# Patient Record
Sex: FEMALE | Race: WHITE | NOT HISPANIC OR LATINO | Employment: FULL TIME | URBAN - METROPOLITAN AREA
[De-identification: names, ages, dates, MRNs, and addresses within clinical notes are randomized per-mention and may not be internally consistent; named-entity substitution may affect disease eponyms.]

---

## 2024-01-01 ENCOUNTER — APPOINTMENT (OUTPATIENT)
Dept: CT IMAGING | Facility: HOSPITAL | Age: 54
End: 2024-01-01
Payer: COMMERCIAL

## 2024-01-01 ENCOUNTER — HOSPITAL ENCOUNTER (OUTPATIENT)
Facility: HOSPITAL | Age: 54
Setting detail: OBSERVATION
Discharge: HOME | End: 2024-01-04
Attending: EMERGENCY MEDICINE | Admitting: STUDENT IN AN ORGANIZED HEALTH CARE EDUCATION/TRAINING PROGRAM
Payer: COMMERCIAL

## 2024-01-01 DIAGNOSIS — G40.909 SEIZURE DISORDER: ICD-10-CM

## 2024-01-01 DIAGNOSIS — R42 VERTIGO: Primary | ICD-10-CM

## 2024-01-01 PROBLEM — F17.200 SMOKER: Status: ACTIVE | Noted: 2022-01-07

## 2024-01-01 PROBLEM — E66.9 DIABETES MELLITUS TYPE 2 IN OBESE: Status: ACTIVE | Noted: 2022-01-10

## 2024-01-01 PROBLEM — E11.69 DIABETES MELLITUS TYPE 2 IN OBESE: Status: ACTIVE | Noted: 2022-01-10

## 2024-01-01 LAB
ANION GAP SERPL CALCULATED.3IONS-SCNC: 8.3 MMOL/L (ref 5–15)
BASOPHILS # BLD AUTO: 0.02 10*3/MM3 (ref 0–0.2)
BASOPHILS NFR BLD AUTO: 0.3 % (ref 0–1.5)
BUN SERPL-MCNC: 19 MG/DL (ref 6–20)
BUN/CREAT SERPL: 21.6 (ref 7–25)
CALCIUM SPEC-SCNC: 9.7 MG/DL (ref 8.6–10.5)
CHLORIDE SERPL-SCNC: 100 MMOL/L (ref 98–107)
CO2 SERPL-SCNC: 24.7 MMOL/L (ref 22–29)
CREAT SERPL-MCNC: 0.88 MG/DL (ref 0.57–1)
DEPRECATED RDW RBC AUTO: 48.5 FL (ref 37–54)
EGFRCR SERPLBLD CKD-EPI 2021: 78.7 ML/MIN/1.73
EOSINOPHIL # BLD AUTO: 0.09 10*3/MM3 (ref 0–0.4)
EOSINOPHIL NFR BLD AUTO: 1.3 % (ref 0.3–6.2)
ERYTHROCYTE [DISTWIDTH] IN BLOOD BY AUTOMATED COUNT: 14 % (ref 12.3–15.4)
GEN 5 2HR TROPONIN T REFLEX: <6 NG/L
GLUCOSE BLDC GLUCOMTR-MCNC: 114 MG/DL (ref 70–130)
GLUCOSE SERPL-MCNC: 113 MG/DL (ref 65–99)
HCT VFR BLD AUTO: 37.3 % (ref 34–46.6)
HGB BLD-MCNC: 12.1 G/DL (ref 12–15.9)
IMM GRANULOCYTES # BLD AUTO: 0.01 10*3/MM3 (ref 0–0.05)
IMM GRANULOCYTES NFR BLD AUTO: 0.1 % (ref 0–0.5)
LYMPHOCYTES # BLD AUTO: 2.39 10*3/MM3 (ref 0.7–3.1)
LYMPHOCYTES NFR BLD AUTO: 35.1 % (ref 19.6–45.3)
MCH RBC QN AUTO: 30.5 PG (ref 26.6–33)
MCHC RBC AUTO-ENTMCNC: 32.4 G/DL (ref 31.5–35.7)
MCV RBC AUTO: 94 FL (ref 79–97)
MONOCYTES # BLD AUTO: 0.48 10*3/MM3 (ref 0.1–0.9)
MONOCYTES NFR BLD AUTO: 7 % (ref 5–12)
NEUTROPHILS NFR BLD AUTO: 3.82 10*3/MM3 (ref 1.7–7)
NEUTROPHILS NFR BLD AUTO: 56.2 % (ref 42.7–76)
PLATELET # BLD AUTO: 185 10*3/MM3 (ref 140–450)
PMV BLD AUTO: 10.8 FL (ref 6–12)
POTASSIUM SERPL-SCNC: 4.2 MMOL/L (ref 3.5–5.2)
RBC # BLD AUTO: 3.97 10*6/MM3 (ref 3.77–5.28)
SODIUM SERPL-SCNC: 133 MMOL/L (ref 136–145)
TROPONIN T DELTA: NORMAL
TROPONIN T SERPL HS-MCNC: <6 NG/L
WBC NRBC COR # BLD AUTO: 6.81 10*3/MM3 (ref 3.4–10.8)

## 2024-01-01 PROCEDURE — 70498 CT ANGIOGRAPHY NECK: CPT

## 2024-01-01 PROCEDURE — 36415 COLL VENOUS BLD VENIPUNCTURE: CPT

## 2024-01-01 PROCEDURE — G0378 HOSPITAL OBSERVATION PER HR: HCPCS

## 2024-01-01 PROCEDURE — 99284 EMERGENCY DEPT VISIT MOD MDM: CPT | Performed by: EMERGENCY MEDICINE

## 2024-01-01 PROCEDURE — 85025 COMPLETE CBC W/AUTO DIFF WBC: CPT | Performed by: EMERGENCY MEDICINE

## 2024-01-01 PROCEDURE — 84484 ASSAY OF TROPONIN QUANT: CPT | Performed by: EMERGENCY MEDICINE

## 2024-01-01 PROCEDURE — 99285 EMERGENCY DEPT VISIT HI MDM: CPT

## 2024-01-01 PROCEDURE — 93005 ELECTROCARDIOGRAM TRACING: CPT | Performed by: EMERGENCY MEDICINE

## 2024-01-01 PROCEDURE — 82948 REAGENT STRIP/BLOOD GLUCOSE: CPT

## 2024-01-01 PROCEDURE — 25510000001 IOPAMIDOL PER 1 ML: Performed by: EMERGENCY MEDICINE

## 2024-01-01 PROCEDURE — 80048 BASIC METABOLIC PNL TOTAL CA: CPT | Performed by: EMERGENCY MEDICINE

## 2024-01-01 PROCEDURE — 70450 CT HEAD/BRAIN W/O DYE: CPT

## 2024-01-01 PROCEDURE — 70496 CT ANGIOGRAPHY HEAD: CPT

## 2024-01-01 PROCEDURE — 93010 ELECTROCARDIOGRAM REPORT: CPT | Performed by: EMERGENCY MEDICINE

## 2024-01-01 RX ORDER — SODIUM CHLORIDE 0.9 % (FLUSH) 0.9 %
10 SYRINGE (ML) INJECTION EVERY 12 HOURS SCHEDULED
Status: DISCONTINUED | OUTPATIENT
Start: 2024-01-01 | End: 2024-01-04 | Stop reason: HOSPADM

## 2024-01-01 RX ORDER — DIVALPROEX SODIUM 500 MG/1
1000 TABLET, DELAYED RELEASE ORAL DAILY
Status: DISCONTINUED | OUTPATIENT
Start: 2024-01-02 | End: 2024-01-03

## 2024-01-01 RX ORDER — SODIUM CHLORIDE 0.9 % (FLUSH) 0.9 %
10 SYRINGE (ML) INJECTION AS NEEDED
Status: DISCONTINUED | OUTPATIENT
Start: 2024-01-01 | End: 2024-01-04 | Stop reason: HOSPADM

## 2024-01-01 RX ORDER — NITROGLYCERIN 0.4 MG/1
0.4 TABLET SUBLINGUAL
Status: DISCONTINUED | OUTPATIENT
Start: 2024-01-01 | End: 2024-01-04 | Stop reason: HOSPADM

## 2024-01-01 RX ORDER — MECLIZINE HYDROCHLORIDE 25 MG/1
25 TABLET ORAL 3 TIMES DAILY PRN
Status: DISCONTINUED | OUTPATIENT
Start: 2024-01-01 | End: 2024-01-04 | Stop reason: HOSPADM

## 2024-01-01 RX ORDER — ROSUVASTATIN CALCIUM 10 MG/1
40 TABLET, COATED ORAL DAILY
Status: DISCONTINUED | OUTPATIENT
Start: 2024-01-02 | End: 2024-01-04 | Stop reason: HOSPADM

## 2024-01-01 RX ORDER — SERTRALINE HYDROCHLORIDE 100 MG/1
100 TABLET, FILM COATED ORAL DAILY
Status: DISCONTINUED | OUTPATIENT
Start: 2024-01-02 | End: 2024-01-04 | Stop reason: HOSPADM

## 2024-01-01 RX ORDER — CHOLECALCIFEROL (VITAMIN D3) 125 MCG
5 CAPSULE ORAL NIGHTLY PRN
Status: DISCONTINUED | OUTPATIENT
Start: 2024-01-01 | End: 2024-01-04 | Stop reason: HOSPADM

## 2024-01-01 RX ORDER — BISACODYL 10 MG
10 SUPPOSITORY, RECTAL RECTAL DAILY PRN
Status: DISCONTINUED | OUTPATIENT
Start: 2024-01-01 | End: 2024-01-04 | Stop reason: HOSPADM

## 2024-01-01 RX ORDER — BISACODYL 5 MG/1
5 TABLET, DELAYED RELEASE ORAL DAILY PRN
Status: DISCONTINUED | OUTPATIENT
Start: 2024-01-01 | End: 2024-01-04 | Stop reason: HOSPADM

## 2024-01-01 RX ORDER — ACETAMINOPHEN 160 MG/5ML
650 SOLUTION ORAL EVERY 4 HOURS PRN
Status: DISCONTINUED | OUTPATIENT
Start: 2024-01-01 | End: 2024-01-04 | Stop reason: HOSPADM

## 2024-01-01 RX ORDER — PANTOPRAZOLE SODIUM 40 MG/1
40 TABLET, DELAYED RELEASE ORAL EVERY MORNING
COMMUNITY

## 2024-01-01 RX ORDER — PANTOPRAZOLE SODIUM 40 MG/1
40 TABLET, DELAYED RELEASE ORAL
Status: DISCONTINUED | OUTPATIENT
Start: 2024-01-02 | End: 2024-01-04 | Stop reason: HOSPADM

## 2024-01-01 RX ORDER — SULFAMETHOXAZOLE AND TRIMETHOPRIM 800; 160 MG/1; MG/1
1 TABLET ORAL 2 TIMES DAILY
Status: DISCONTINUED | OUTPATIENT
Start: 2024-01-02 | End: 2024-01-04 | Stop reason: HOSPADM

## 2024-01-01 RX ORDER — HYDROCHLOROTHIAZIDE 12.5 MG/1
12.5 TABLET ORAL
Status: DISCONTINUED | OUTPATIENT
Start: 2024-01-02 | End: 2024-01-04 | Stop reason: HOSPADM

## 2024-01-01 RX ORDER — SULFAMETHOXAZOLE AND TRIMETHOPRIM 800; 160 MG/1; MG/1
1 TABLET ORAL 2 TIMES DAILY
Status: DISCONTINUED | OUTPATIENT
Start: 2024-01-02 | End: 2024-01-01

## 2024-01-01 RX ORDER — LISINOPRIL 20 MG/1
20 TABLET ORAL
Status: DISCONTINUED | OUTPATIENT
Start: 2024-01-02 | End: 2024-01-04 | Stop reason: HOSPADM

## 2024-01-01 RX ORDER — SERTRALINE HYDROCHLORIDE 100 MG/1
100 TABLET, FILM COATED ORAL EVERY MORNING
COMMUNITY

## 2024-01-01 RX ORDER — SODIUM CHLORIDE 9 MG/ML
40 INJECTION, SOLUTION INTRAVENOUS AS NEEDED
Status: DISCONTINUED | OUTPATIENT
Start: 2024-01-01 | End: 2024-01-04 | Stop reason: HOSPADM

## 2024-01-01 RX ORDER — DIPHENHYDRAMINE HCL 25 MG
25 CAPSULE ORAL EVERY 4 HOURS PRN
Status: DISCONTINUED | OUTPATIENT
Start: 2024-01-01 | End: 2024-01-04 | Stop reason: HOSPADM

## 2024-01-01 RX ORDER — ROSUVASTATIN CALCIUM 20 MG/1
40 TABLET, COATED ORAL EVERY MORNING
COMMUNITY
End: 2024-01-04 | Stop reason: HOSPADM

## 2024-01-01 RX ORDER — POLYETHYLENE GLYCOL 3350 17 G/17G
17 POWDER, FOR SOLUTION ORAL DAILY PRN
Status: DISCONTINUED | OUTPATIENT
Start: 2024-01-01 | End: 2024-01-04 | Stop reason: HOSPADM

## 2024-01-01 RX ORDER — EZETIMIBE 10 MG/1
10 TABLET ORAL EVERY MORNING
COMMUNITY

## 2024-01-01 RX ORDER — ALBUTEROL SULFATE 2.5 MG/3ML
2.5 SOLUTION RESPIRATORY (INHALATION) EVERY 6 HOURS PRN
Status: DISCONTINUED | OUTPATIENT
Start: 2024-01-01 | End: 2024-01-04 | Stop reason: HOSPADM

## 2024-01-01 RX ORDER — ACETAMINOPHEN 650 MG/1
650 SUPPOSITORY RECTAL EVERY 4 HOURS PRN
Status: DISCONTINUED | OUTPATIENT
Start: 2024-01-01 | End: 2024-01-04 | Stop reason: HOSPADM

## 2024-01-01 RX ORDER — AMOXICILLIN 250 MG
2 CAPSULE ORAL 2 TIMES DAILY PRN
Status: DISCONTINUED | OUTPATIENT
Start: 2024-01-01 | End: 2024-01-04 | Stop reason: HOSPADM

## 2024-01-01 RX ORDER — SULFAMETHOXAZOLE AND TRIMETHOPRIM 800; 160 MG/1; MG/1
1 TABLET ORAL 2 TIMES DAILY
COMMUNITY

## 2024-01-01 RX ORDER — MECLIZINE HYDROCHLORIDE 25 MG/1
50 TABLET ORAL ONCE
Status: COMPLETED | OUTPATIENT
Start: 2024-01-01 | End: 2024-01-01

## 2024-01-01 RX ORDER — ACETAMINOPHEN 325 MG/1
650 TABLET ORAL EVERY 4 HOURS PRN
Status: DISCONTINUED | OUTPATIENT
Start: 2024-01-01 | End: 2024-01-04 | Stop reason: HOSPADM

## 2024-01-01 RX ORDER — ONDANSETRON 2 MG/ML
4 INJECTION INTRAMUSCULAR; INTRAVENOUS EVERY 6 HOURS PRN
Status: DISCONTINUED | OUTPATIENT
Start: 2024-01-01 | End: 2024-01-04 | Stop reason: HOSPADM

## 2024-01-01 RX ADMIN — MECLIZINE HYDROCHLORIDE 50 MG: 25 TABLET ORAL at 17:25

## 2024-01-01 RX ADMIN — IOPAMIDOL 100 ML: 755 INJECTION, SOLUTION INTRAVENOUS at 19:06

## 2024-01-01 RX ADMIN — Medication 10 ML: at 23:21

## 2024-01-01 NOTE — FSED PROVIDER NOTE
Subjective   History of Present Illness  53-year-old female otherwise healthy coming in for dizziness described as vertigo that started yesterday when she woke up around 10 AM patient states that the room is spinning even without her moving her head left or right no blurry vision no headaches fevers chills shortness of breath chest pain head trauma has not taken anything for the dizziness        Review of Systems   Constitutional:  Negative for activity change and appetite change.   HENT:  Negative for congestion, rhinorrhea and sinus pressure.    Respiratory:  Negative for cough and shortness of breath.    Cardiovascular:  Negative for chest pain and leg swelling.   Gastrointestinal:  Negative for abdominal pain.   Genitourinary:  Negative for difficulty urinating and dysuria.   Neurological:  Positive for dizziness. Negative for headaches.   All other systems reviewed and are negative.      Past Medical History:   Diagnosis Date    Arthritis     lower back    Asthma     Depression     Diabetes mellitus     Epilepsy     High cholesterol     History of pneumonia     Hypertension     Recurrent boils     venessa inner thighs pt applies clindamycin topical and is taking doxycycline     Sleep apnea     no machine    Urinary incontinence        Allergies   Allergen Reactions    Amoxicillin-Pot Clavulanate Other (See Comments)     Bad yeast infection       Past Surgical History:   Procedure Laterality Date    CYSTOSCOPY W/ BULKING AGENT INJECTION N/A 3/11/2016    Procedure: CYSTOSCOPY WITH COAPTITE INJECTION;  Surgeon: Luis Dumont MD;  Location: [facility];  Service:     LAPAROSCOPIC TUBAL LIGATION Bilateral     TRANSVAGINAL TAPING SUSPENSION         History reviewed. No pertinent family history.    Social History     Socioeconomic History    Marital status:    Tobacco Use    Smoking status: Every Day     Packs/day: 1.50     Years: 30.00     Additional pack years: 0.00     Total pack years: 45.00      Types: Cigarettes   Substance and Sexual Activity    Alcohol use: Yes     Comment: couple month    Drug use: No           Objective   Physical Exam  Vitals and nursing note reviewed.   Constitutional:       General: She is not in acute distress.     Appearance: She is well-developed. She is not ill-appearing.   HENT:      Head: Normocephalic and atraumatic.      Right Ear: Tympanic membrane normal.      Left Ear: Tympanic membrane normal.      Nose: No congestion or rhinorrhea.   Pulmonary:      Effort: Pulmonary effort is normal. No respiratory distress.      Breath sounds: Normal breath sounds. No stridor.   Neurological:      General: No focal deficit present.      Mental Status: She is alert and oriented to person, place, and time. Mental status is at baseline. She is disoriented.      Cranial Nerves: No cranial nerve deficit.      Sensory: No sensory deficit.      Motor: No weakness.      Coordination: Coordination normal.         Procedures           ED Course                                           Medical Decision Making  53-year-old female otherwise healthy coming in for dizziness which she describes as vertigo neuroexam benign NIH is 0 CT angio CT head pending labs within normal limits follow-up with CT and touch base with neurology hospitalist in terms of disposition    Amount and/or Complexity of Data Reviewed  Labs: ordered.  Radiology: ordered.  ECG/medicine tests: ordered.    Risk  Prescription drug management.        Final diagnoses:   None       ED Disposition  ED Disposition       None            No follow-up provider specified.       Medication List      No changes were made to your prescriptions during this visit.

## 2024-01-02 ENCOUNTER — APPOINTMENT (OUTPATIENT)
Dept: CARDIOLOGY | Facility: HOSPITAL | Age: 54
End: 2024-01-02
Payer: COMMERCIAL

## 2024-01-02 ENCOUNTER — APPOINTMENT (OUTPATIENT)
Dept: MRI IMAGING | Facility: HOSPITAL | Age: 54
End: 2024-01-02
Payer: COMMERCIAL

## 2024-01-02 LAB
AMMONIA BLD-SCNC: 58 UMOL/L (ref 11–51)
ANION GAP SERPL CALCULATED.3IONS-SCNC: 10 MMOL/L (ref 5–15)
BASOPHILS # BLD AUTO: 0.1 10*3/MM3 (ref 0–0.2)
BASOPHILS NFR BLD AUTO: 0.9 % (ref 0–1.5)
BH CV ECHO MEAS - ACS: 2 CM
BH CV ECHO MEAS - AO MAX PG: 9.1 MMHG
BH CV ECHO MEAS - AO MEAN PG: 5 MMHG
BH CV ECHO MEAS - AO V2 MAX: 151 CM/SEC
BH CV ECHO MEAS - AO V2 VTI: 28.3 CM
BH CV ECHO MEAS - AVA(I,D): 3.2 CM2
BH CV ECHO MEAS - EDV(CUBED): 54.9 ML
BH CV ECHO MEAS - EDV(MOD-SP4): 45.9 ML
BH CV ECHO MEAS - EF(MOD-SP4): 54.9 %
BH CV ECHO MEAS - ESV(CUBED): 17.6 ML
BH CV ECHO MEAS - ESV(MOD-SP4): 20.7 ML
BH CV ECHO MEAS - FS: 31.6 %
BH CV ECHO MEAS - IVS/LVPW: 1.09 CM
BH CV ECHO MEAS - IVSD: 1.2 CM
BH CV ECHO MEAS - LA DIMENSION: 3.2 CM
BH CV ECHO MEAS - LAT PEAK E' VEL: 11.2 CM/SEC
BH CV ECHO MEAS - LV MASS(C)D: 143.8 GRAMS
BH CV ECHO MEAS - LV MAX PG: 8.8 MMHG
BH CV ECHO MEAS - LV MEAN PG: 4 MMHG
BH CV ECHO MEAS - LV V1 MAX: 148 CM/SEC
BH CV ECHO MEAS - LV V1 VTI: 29 CM
BH CV ECHO MEAS - LVIDD: 3.8 CM
BH CV ECHO MEAS - LVIDS: 2.6 CM
BH CV ECHO MEAS - LVOT AREA: 3.1 CM2
BH CV ECHO MEAS - LVOT DIAM: 2 CM
BH CV ECHO MEAS - LVPWD: 1.1 CM
BH CV ECHO MEAS - MED PEAK E' VEL: 12 CM/SEC
BH CV ECHO MEAS - MV A MAX VEL: 73.4 CM/SEC
BH CV ECHO MEAS - MV DEC SLOPE: 253 CM/SEC2
BH CV ECHO MEAS - MV DEC TIME: 0.3 SEC
BH CV ECHO MEAS - MV E MAX VEL: 64.5 CM/SEC
BH CV ECHO MEAS - MV E/A: 0.88
BH CV ECHO MEAS - MV MAX PG: 2.8 MMHG
BH CV ECHO MEAS - MV MEAN PG: 1 MMHG
BH CV ECHO MEAS - MV P1/2T: 100.7 MSEC
BH CV ECHO MEAS - MV V2 VTI: 28.6 CM
BH CV ECHO MEAS - MVA(P1/2T): 2.18 CM2
BH CV ECHO MEAS - MVA(VTI): 3.2 CM2
BH CV ECHO MEAS - PA V2 MAX: 110.5 CM/SEC
BH CV ECHO MEAS - PULM A REVS DUR: 0.12 SEC
BH CV ECHO MEAS - PULM A REVS VEL: 33.6 CM/SEC
BH CV ECHO MEAS - PULM DIAS VEL: 36 CM/SEC
BH CV ECHO MEAS - PULM S/D: 1.79
BH CV ECHO MEAS - PULM SYS VEL: 64.4 CM/SEC
BH CV ECHO MEAS - QP/QS: 0.46
BH CV ECHO MEAS - RV MAX PG: 1.36 MMHG
BH CV ECHO MEAS - RV V1 MAX: 58.3 CM/SEC
BH CV ECHO MEAS - RV V1 VTI: 13.3 CM
BH CV ECHO MEAS - RVDD: 3.3 CM
BH CV ECHO MEAS - RVOT DIAM: 2 CM
BH CV ECHO MEAS - SV(LVOT): 91.1 ML
BH CV ECHO MEAS - SV(MOD-SP4): 25.2 ML
BH CV ECHO MEAS - SV(RVOT): 41.8 ML
BH CV ECHO MEAS - TAPSE (>1.6): 2.2 CM
BH CV ECHO MEASUREMENTS AVERAGE E/E' RATIO: 5.56
BH CV XLRA - TDI S': 12.6 CM/SEC
BUN SERPL-MCNC: 19 MG/DL (ref 6–20)
BUN/CREAT SERPL: 21.1 (ref 7–25)
CALCIUM SPEC-SCNC: 9.8 MG/DL (ref 8.6–10.5)
CHLORIDE SERPL-SCNC: 96 MMOL/L (ref 98–107)
CHOLEST SERPL-MCNC: 138 MG/DL (ref 0–200)
CO2 SERPL-SCNC: 26 MMOL/L (ref 22–29)
CREAT SERPL-MCNC: 0.9 MG/DL (ref 0.57–1)
CRP SERPL-MCNC: <0.3 MG/DL (ref 0–0.5)
DEPRECATED RDW RBC AUTO: 48.1 FL (ref 37–54)
EGFRCR SERPLBLD CKD-EPI 2021: 76.6 ML/MIN/1.73
EOSINOPHIL # BLD AUTO: 0.2 10*3/MM3 (ref 0–0.4)
EOSINOPHIL NFR BLD AUTO: 1.9 % (ref 0.3–6.2)
ERYTHROCYTE [DISTWIDTH] IN BLOOD BY AUTOMATED COUNT: 14.8 % (ref 12.3–15.4)
ERYTHROCYTE [SEDIMENTATION RATE] IN BLOOD: 9 MM/HR (ref 0–30)
GLUCOSE SERPL-MCNC: 110 MG/DL (ref 65–99)
HBA1C MFR BLD: 6 % (ref 4.8–5.6)
HCT VFR BLD AUTO: 43.3 % (ref 34–46.6)
HDLC SERPL-MCNC: 36 MG/DL (ref 40–60)
HGB BLD-MCNC: 14.3 G/DL (ref 12–15.9)
LDLC SERPL CALC-MCNC: 77 MG/DL (ref 0–100)
LDLC/HDLC SERPL: 2.05 {RATIO}
LYMPHOCYTES # BLD AUTO: 3.4 10*3/MM3 (ref 0.7–3.1)
LYMPHOCYTES NFR BLD AUTO: 41.4 % (ref 19.6–45.3)
MAGNESIUM SERPL-MCNC: 2.3 MG/DL (ref 1.6–2.6)
MCH RBC QN AUTO: 30.7 PG (ref 26.6–33)
MCHC RBC AUTO-ENTMCNC: 33 G/DL (ref 31.5–35.7)
MCV RBC AUTO: 93.1 FL (ref 79–97)
MONOCYTES # BLD AUTO: 0.6 10*3/MM3 (ref 0.1–0.9)
MONOCYTES NFR BLD AUTO: 6.8 % (ref 5–12)
NEUTROPHILS NFR BLD AUTO: 4 10*3/MM3 (ref 1.7–7)
NEUTROPHILS NFR BLD AUTO: 49 % (ref 42.7–76)
NRBC BLD AUTO-RTO: 0.1 /100 WBC (ref 0–0.2)
PHOSPHATE SERPL-MCNC: 4.3 MG/DL (ref 2.5–4.5)
PLATELET # BLD AUTO: 231 10*3/MM3 (ref 140–450)
PMV BLD AUTO: 9.4 FL (ref 6–12)
POTASSIUM SERPL-SCNC: 3.6 MMOL/L (ref 3.5–5.2)
QT INTERVAL: 373 MS
QTC INTERVAL: 451 MS
RBC # BLD AUTO: 4.65 10*6/MM3 (ref 3.77–5.28)
SINUS: 3.3 CM
SODIUM SERPL-SCNC: 132 MMOL/L (ref 136–145)
TRIGL SERPL-MCNC: 141 MG/DL (ref 0–150)
TSH SERPL DL<=0.05 MIU/L-ACNC: 3.06 UIU/ML (ref 0.27–4.2)
VALPROATE SERPL-MCNC: 67 MCG/ML (ref 50–125)
VLDLC SERPL-MCNC: 25 MG/DL (ref 5–40)
WBC NRBC COR # BLD AUTO: 8.1 10*3/MM3 (ref 3.4–10.8)

## 2024-01-02 PROCEDURE — G0378 HOSPITAL OBSERVATION PER HR: HCPCS

## 2024-01-02 PROCEDURE — 99214 OFFICE O/P EST MOD 30 MIN: CPT | Performed by: NURSE PRACTITIONER

## 2024-01-02 PROCEDURE — 86140 C-REACTIVE PROTEIN: CPT | Performed by: NURSE PRACTITIONER

## 2024-01-02 PROCEDURE — 80164 ASSAY DIPROPYLACETIC ACD TOT: CPT | Performed by: NURSE PRACTITIONER

## 2024-01-02 PROCEDURE — 82607 VITAMIN B-12: CPT | Performed by: NURSE PRACTITIONER

## 2024-01-02 PROCEDURE — 85025 COMPLETE CBC W/AUTO DIFF WBC: CPT

## 2024-01-02 PROCEDURE — 83735 ASSAY OF MAGNESIUM: CPT | Performed by: NURSE PRACTITIONER

## 2024-01-02 PROCEDURE — 93306 TTE W/DOPPLER COMPLETE: CPT | Performed by: INTERNAL MEDICINE

## 2024-01-02 PROCEDURE — 93306 TTE W/DOPPLER COMPLETE: CPT

## 2024-01-02 PROCEDURE — 83036 HEMOGLOBIN GLYCOSYLATED A1C: CPT | Performed by: NURSE PRACTITIONER

## 2024-01-02 PROCEDURE — 70551 MRI BRAIN STEM W/O DYE: CPT

## 2024-01-02 PROCEDURE — 80061 LIPID PANEL: CPT | Performed by: NURSE PRACTITIONER

## 2024-01-02 PROCEDURE — 84100 ASSAY OF PHOSPHORUS: CPT | Performed by: NURSE PRACTITIONER

## 2024-01-02 PROCEDURE — 85652 RBC SED RATE AUTOMATED: CPT | Performed by: NURSE PRACTITIONER

## 2024-01-02 PROCEDURE — 84443 ASSAY THYROID STIM HORMONE: CPT | Performed by: NURSE PRACTITIONER

## 2024-01-02 PROCEDURE — 82140 ASSAY OF AMMONIA: CPT | Performed by: NURSE PRACTITIONER

## 2024-01-02 PROCEDURE — 80048 BASIC METABOLIC PNL TOTAL CA: CPT

## 2024-01-02 RX ORDER — LORAZEPAM 1 MG/1
1 TABLET ORAL ONCE
Status: COMPLETED | OUTPATIENT
Start: 2024-01-02 | End: 2024-01-02

## 2024-01-02 RX ADMIN — ROSUVASTATIN 40 MG: 10 TABLET, FILM COATED ORAL at 08:06

## 2024-01-02 RX ADMIN — SULFAMETHOXAZOLE AND TRIMETHOPRIM 1 TABLET: 800; 160 TABLET ORAL at 21:01

## 2024-01-02 RX ADMIN — LORAZEPAM 1 MG: 1 TABLET ORAL at 14:31

## 2024-01-02 RX ADMIN — DIVALPROEX SODIUM 1000 MG: 500 TABLET, DELAYED RELEASE ORAL at 08:06

## 2024-01-02 RX ADMIN — SULFAMETHOXAZOLE AND TRIMETHOPRIM 1 TABLET: 800; 160 TABLET ORAL at 08:10

## 2024-01-02 RX ADMIN — HYDROCHLOROTHIAZIDE 12.5 MG: 12.5 TABLET ORAL at 08:06

## 2024-01-02 RX ADMIN — Medication 10 ML: at 21:01

## 2024-01-02 RX ADMIN — Medication 10 ML: at 08:06

## 2024-01-02 RX ADMIN — SERTRALINE 100 MG: 100 TABLET, FILM COATED ORAL at 08:06

## 2024-01-02 RX ADMIN — LISINOPRIL 20 MG: 20 TABLET ORAL at 08:06

## 2024-01-02 RX ADMIN — SULFAMETHOXAZOLE AND TRIMETHOPRIM 1 TABLET: 800; 160 TABLET ORAL at 00:07

## 2024-01-02 RX ADMIN — PANTOPRAZOLE SODIUM 40 MG: 40 TABLET, DELAYED RELEASE ORAL at 08:06

## 2024-01-02 NOTE — CONSULTS
Primary Care Provider: Vito Schreiber MD     Consult requested by:  Dr. Sheets    Reason for Consultation: Neurological evaluation     History taken from: patient chart RN    Chief complaint: vertigo        SUBJECTIVE:    History of present illness: Background per H&P: Vero Cadena is a 53 y.o. year old female who presented to [facility] on 1/1/2024 with dizziness that was present when she woke up on 12/31/2023.  She states that the room is spinning even without her moving her head.  She denies blurry vision, headaches, chest pain, weakness, trouble speaking.     At [facility], labwork is unremarkable. CT of the head showed no acute abnormality.  CTA of the head and neck showed no intracranial large vessel occlusion, mild bilateral proximal ICA stenosis is noted. EKG shows SR, HR 88.  She is afebrile, all vitals are stable.  Hospitalist was consulted for transfer to [facility] for an MRI of the brain and further management.    Previous medical history of Asthma, tobacco abuse, HTN, Hyperlipidemia, depression, seizures and DM   - Portions of the above HPI were copied from previous encounters and edited as appropriate. PMH as detailed below.     Pt continues to have vertigo when she sits up or changes position. Improved when lying flat. She denies any other complaints. No focal deficits, ataxia, vision changes (aside from difficulty focusing due to vertigo). No ear pain, drainage, or pressure. No tinnitus. No head injury, falls, coughing, sneezing, straining, or whiplash type injury. No hx of vertigo    Review of Systems   Constitutional:  Negative for chills, diaphoresis and fatigue.   Eyes:  Negative for photophobia and visual disturbance.   Neurological:  Positive for dizziness. Negative for tremors, seizures, syncope, facial asymmetry, speech difficulty, weakness, light-headedness, numbness and headaches.           PATIENT HISTORY:  Past Medical History:   Diagnosis Date    Arthritis      lower back    Asthma     Depression     Diabetes mellitus     Epilepsy     High cholesterol     History of pneumonia     Hypertension     Recurrent boils     venessa inner thighs pt applies clindamycin topical and is taking doxycycline     Sleep apnea     no machine    Urinary incontinence    ,   Past Surgical History:   Procedure Laterality Date    CYSTOSCOPY W/ BULKING AGENT INJECTION N/A 3/11/2016    Procedure: CYSTOSCOPY WITH COAPTITE INJECTION;  Surgeon: Luis Dumont MD;  Location: [facility] OR;  Service:     LAPAROSCOPIC TUBAL LIGATION Bilateral     TRANSVAGINAL TAPING SUSPENSION     , History reviewed. No pertinent family history.,   Social History     Tobacco Use    Smoking status: Every Day     Packs/day: 1.50     Years: 30.00     Additional pack years: 0.00     Total pack years: 45.00     Types: Cigarettes     Passive exposure: Current   Vaping Use    Vaping Use: Never used   Substance Use Topics    Alcohol use: Not Currently     Comment: couple month    Drug use: No   ,   Prior to Admission medications    Medication Sig Start Date End Date Taking? Authorizing Provider   clindamycin (CLEOCIN T) 1 % swab Apply 1 application topically 2 (two) times a day.   Yes Jina Fernandes MD   divalproex (DEPAKOTE) 500 MG EC tablet Take 2 tablets by mouth Every Morning.   Yes Jina Fernandes MD   ezetimibe (ZETIA) 10 MG tablet Take 1 tablet by mouth Every Morning.   Yes Jina Fernandes MD   lisinopril-hydrochlorothiazide (PRINZIDE,ZESTORETIC) 20-12.5 MG per tablet Take 1 tablet by mouth Every Morning.   Yes Jina Fernandes MD   meloxicam (MOBIC) 7.5 MG tablet Take 1 tablet by mouth Daily.   Yes Jina Fernandes MD   metFORMIN (GLUCOPHAGE) 500 MG tablet Take 1 tablet by mouth Daily With Breakfast.   Yes Jina Fernandes MD   pantoprazole (PROTONIX) 40 MG EC tablet Take 1 tablet by mouth Every Morning.   Yes Jina Fernandes MD   rosuvastatin (CRESTOR) 20 MG tablet Take 2  tablets by mouth Every Morning.   Yes Jina Fernandes MD   sertraline (ZOLOFT) 100 MG tablet Take 1 tablet by mouth Every Morning.   Yes Jina Fernandes MD   sulfamethoxazole-trimethoprim (BACTRIM DS,SEPTRA DS) 800-160 MG per tablet Take 1 tablet by mouth 2 (Two) Times a Day.   Yes Jina Fernandes MD   acetaminophen (TYLENOL) 500 MG tablet Take 1 tablet by mouth Every 4 (Four) Hours As Needed for Mild Pain.    Jina Fernandes MD   albuterol (PROVENTIL HFA;VENTOLIN HFA) 108 (90 BASE) MCG/ACT inhaler Inhale 2 puffs Every 4 (Four) Hours As Needed for Wheezing.    Jina Fernandes MD   ARIPiprazole (ABILIFY) 2 MG tablet Take 1 tablet by mouth Every Morning.    Jina Fernandes MD   atorvastatin (LIPITOR) 20 MG tablet Take 1 tablet by mouth Every Morning.    Jina Fernandes MD   azithromycin (Zithromax Z-Wyatt) 250 MG tablet Take 2 tablets by mouth on day 1, then 1 tablet daily on days 2-5 3/6/23   Tyler Montelongo MD   doxycycline (DORYX) 100 MG DR capsule Take 1 capsule by mouth 2 (Two) Times a Day.    Jina Fernandes MD   escitalopram (LEXAPRO) 10 MG tablet Take 1 tablet by mouth Daily.    Jina Fernandes MD   Fluticasone Furoate-Vilanterol 200-25 MCG/INH aerosol powder  Inhale 1 puff Every Night.    Jina Fernandes MD   HYDROcodone-acetaminophen (NORCO) 5-325 MG per tablet Take 1 tablet by mouth every 6 (six) hours as needed for severe pain (7-10). 7/12/16   Saw Rivera MD   methocarbamol (ROBAXIN) 500 MG tablet Take 1 tablet by mouth 4 (four) times a day as needed for muscle spasms. 7/12/16   Enmanuel Ramires III, PA   methylphenidate (CONCERTA) 27 MG CR tablet Take 1 tablet by mouth 4 am    Jina Fernandes MD   methylphenidate (RITALIN) 20 MG tablet Take 1 tablet by mouth. 2 pm    Jina Fernandes MD   Mirabegron ER 50 MG tablet sustained-release 24 hour Take 50 mg by mouth Every Morning.    Provider, Historical, MD   montelukast  (SINGULAIR) 10 MG tablet Take 1 tablet by mouth Every Night.    Provider, MD Jina    Allergies:  Amoxicillin-pot clavulanate    Current Facility-Administered Medications   Medication Dose Route Frequency Provider Last Rate Last Admin    acetaminophen (TYLENOL) tablet 650 mg  650 mg Oral Q4H PRN Grecia Castellano APRN        Or    acetaminophen (TYLENOL) 160 MG/5ML oral solution 650 mg  650 mg Oral Q4H PRN Grecia Castellano APRN        Or    acetaminophen (TYLENOL) suppository 650 mg  650 mg Rectal Q4H PRN Grecia Castellano APRN        albuterol (PROVENTIL) nebulizer solution 0.083% 2.5 mg/3mL  2.5 mg Nebulization Q6H PRN Grecia Castellano APRN        sennosides-docusate (PERICOLACE) 8.6-50 MG per tablet 2 tablet  2 tablet Oral BID PRN Grecia Castellano APRN        And    polyethylene glycol (MIRALAX) packet 17 g  17 g Oral Daily PRN Grecia Castellano APRN        And    bisacodyl (DULCOLAX) EC tablet 5 mg  5 mg Oral Daily PRN Grecia Castellano APRN        And    bisacodyl (DULCOLAX) suppository 10 mg  10 mg Rectal Daily PRN Grecia Castellano APRN        diphenhydrAMINE (BENADRYL) capsule 25 mg  25 mg Oral Q4H PRN Grecia Castellano APRN        divalproex (DEPAKOTE) DR tablet 1,000 mg  1,000 mg Oral Daily Grecia Castellano APRN   1,000 mg at 01/02/24 0806    lisinopril (PRINIVIL,ZESTRIL) tablet 20 mg  20 mg Oral Q24H Grecia Castellano APRN   20 mg at 01/02/24 0806    And    hydroCHLOROthiazide (HYDRODIURIL) tablet 12.5 mg  12.5 mg Oral Q24H Grecia Castellano APRN   12.5 mg at 01/02/24 0806    meclizine (ANTIVERT) tablet 25 mg  25 mg Oral TID PRN Grecia Castellano APRN        melatonin tablet 5 mg  5 mg Oral Nightly PRN Grecia Castellano APRN        nitroglycerin (NITROSTAT) SL tablet 0.4 mg  0.4 mg Sublingual Q5 Min PRN Grecia Castellano APRN        ondansetron (ZOFRAN) injection 4 mg  4 mg Intravenous Q6H PRN Grecia Castellano APRN        pantoprazole (PROTONIX) EC tablet 40 mg  40 mg Oral QAM AC Grecia Castellano  RUTHIE, APRN   40 mg at 01/02/24 0806    rosuvastatin (CRESTOR) tablet 40 mg  40 mg Oral Daily Grecia Castellano APRN   40 mg at 01/02/24 0806    sertraline (ZOLOFT) tablet 100 mg  100 mg Oral Daily Grecia Castellano APRN   100 mg at 01/02/24 0806    sodium chloride 0.9 % flush 10 mL  10 mL Intravenous Q12H Grecia Castellano, APRN   10 mL at 01/02/24 0806    sodium chloride 0.9 % flush 10 mL  10 mL Intravenous PRN Grecia Castellano APRGINGER        sodium chloride 0.9 % infusion 40 mL  40 mL Intravenous PRN Shawnee Castellanojen HENDRICKS APRGINGER        sulfamethoxazole-trimethoprim (BACTRIM DS,SEPTRA DS) 800-160 MG per tablet 1 tablet  1 tablet Oral BID Grecia Castellano APRN   1 tablet at 01/02/24 0810        ________________________________________________________        OBJECTIVE:  Upon today's exam, pt is awake and alert. Oriented x3.      Neurologic Exam  PHYSICAL EXAM:    CONSTITUTIONAL: The patient is in no apparent distress, bright awake and alert.      HEENT: There is no tenderness over the temporal arteries bilaterally. Normocephalic, atraumatic. TMJ open symmetrically without tenderness.    NECK: ROM normal, supple    RESPIRATORY: Breathing unlabored, Breath sounds are normal    CARDIAC: Regular rate and rhythm.     EXTREMITIES:  No clubbing, cyanosis or edema.    PSYCHIATRIC: Mood/affect normal, judgement normal, appropriate    NEUROLOGICAL:    Cognition:   Fully oriented.  Fund of knowledge excellent.  Concentration and attention normal.   Language normal with normal comprehension, fluent speech, intact repetition and naming.   Short and long term memory appears intact    Cranial nerves;    II - pupils bilaterally equal reacting to light,  No new Visual field deficits;  Fundoscopic exam- Not able to be done, non-dilated exam  III,IV,VI: EOMI with no diplopia  V: Normal facial sensations  VII: No facial asymmetry,  VIII: No New hearing abnormality  IX, X, XI: normal gag and shoulder shrug;  XII: tongue is in the  midline.    Sensory:  Intact to light touch in all extremities.     Motor: Strength 5/5 bilaterally upper and lower extremities. No involuntary movements present. Normal tone and bulk.  Deep tendon reflexes: 2/4 and symmetrical in biceps, brachioradialis, triceps, bilateral 2/4 knees and ankles. Both plantars are flexor.    Cerebellar: Finger to nose and mirror movements normal bilaterally.    Gait and balance: deferred    ________________________________________________________   RESULTS REVIEW:    VITAL SIGNS:   Temp:  [97.7 °F (36.5 °C)-98.6 °F (37 °C)] 97.9 °F (36.6 °C)  Heart Rate:  [64-97] 65  Resp:  [10-19] 12  BP: ()/() 93/60     LABS:      Lab 01/02/24  0309 01/01/24  1724   WBC 8.10 6.81   HEMOGLOBIN 14.3 12.1   HEMATOCRIT 43.3 37.3   PLATELETS 231 185   NEUTROS ABS 4.00 3.82   IMMATURE GRANS (ABS)  --  0.01   LYMPHS ABS 3.40* 2.39   MONOS ABS 0.60 0.48   EOS ABS 0.20 0.09   MCV 93.1 94.0         Lab 01/02/24  0309 01/01/24  1724   SODIUM 132* 133*   POTASSIUM 3.6 4.2   CHLORIDE 96* 100   CO2 26.0 24.7   ANION GAP 10.0 8.3   BUN 19 19   CREATININE 0.90 0.88   EGFR 76.6 78.7   GLUCOSE 110* 113*   CALCIUM 9.8 9.7             Lab 01/01/24  1935 01/01/24  1724   HSTROP T <6 <6                 UA          5/8/2023    10:44 10/10/2023    15:14 12/26/2023    16:11   Urinalysis   Specific Gravity, UA 1.010     1.010     1.020       Nitrite, UA Positive     Positive     Negative          Details          This result is from an external source.               Lab Results   Component Value Date    TSH 2.330 02/21/2019     (H) 08/19/2020    HGBA1C 7.2 (H) 06/13/2023    [id number] 06/13/2023       IMAGING STUDIES:  CT Angiogram Head    Result Date: 1/1/2024  Impression: 1. No evidence of large vessel occlusion within the intracranial vasculature. 2. Mild bilateral proximal internal carotid artery stenosis. 3. No evidence of vertebral artery dissection or stenosis. Electronically Signed: Brigido  Esther  1/1/2024 8:38 PM EST  Workstation ID: [id number]    CT Angiogram Neck    Result Date: 1/1/2024  Impression: 1. No evidence of large vessel occlusion within the intracranial vasculature. 2. Mild bilateral proximal internal carotid artery stenosis. 3. No evidence of vertebral artery dissection or stenosis. Electronically Signed: Brigido Esther  1/1/2024 8:38 PM EST  Workstation ID: [id number]    CT Head Without Contrast    Result Date: 1/1/2024  Impression: No acute intracranial abnormality. Electronically Signed: Tejas Anders MD  1/1/2024 7:25 PM EST  Workstation ID: [id number]     I reviewed the patient's new clinical results.    ________________________________________________________     PROBLEM LIST:    Vertigo    Depression    Diabetes mellitus type 2 in obese    Essential hypertension    Smoker    Seizure disorder      ASSESSMENT/PLAN:  1. Vertigo. Suspect BPPV. Cannot rule out posterior circulation stroke, awaiting MRI brain.   - CT head: No acute intracranial abnormality.  - CTA head and neck: No evidence of large vessel occlusion within the intracranial vasculature. Mild bilateral proximal internal carotid artery stenosis. No evidence of vertebral artery dissection or stenosis.  - MRI brain: pending   - Echo: unremarkable, EF 56 - 60%.   - EKG: Sinus rhythm. Minimal ST depression, diffuse leads  - Labs: A1C: P, B12: P, LDL: P, TSH: P, ammonia: P, ESR: P, CRP: P  - PT/OT/ST as appropriate, fall precautions as appropriate, Neuro checks per protocol, DVT prophylaxis, Stroke education  - PT evaluation for vertgio, Epley maneuver if indicated   - Meclizine PRN   - Further recommendations pending MRI brain.     2. Seizure disorder. No seizure activity.  - On VPA, level pending; ammonia pending   - Seizure precautions       Modification of stroke risk factors:   - Blood pressure should be less than 130/80 outpatient, HbA1c less than 6.5, LDL less than 70; b12>500 and smoking cessation if applicable. We would  be grateful if the primary team / primary care physician would keep a close watch on the above targets.  - Stroke education  - Follow up with neurologist of choice      I discussed the patient's findings and my recommendations with patient, nursing staff, and consulting provider    PATRICIA Arita  01/02/24  16:36 EST

## 2024-01-02 NOTE — PLAN OF CARE
Goal Outcome Evaluation:  Plan of Care Reviewed With: patient        Progress: no change  Outcome Evaluation: Patient complains of dizziness with position changes. Orthostatics negative but blood pressure soft. Awaiting MRI Brain. Neurology following. Patient resting comfortably in bed with no complaints.

## 2024-01-02 NOTE — PLAN OF CARE
Goal Outcome Evaluation:  Plan of Care Reviewed With: patient        Progress: no change  Outcome Evaluation: Pt with no complaints of pain. Some vertigo when standing. Pt aware that d/t the vertigo she should ask for help when getting up. Pt on heart monitor, SCD's in place. MRI of brain to be done at some point later today. VSS and call light within reach. Plan ongoing.

## 2024-01-02 NOTE — H&P
[facility] Medicine Services  History & Physical    Patient Name: Vero Cadena  : 1970  MRN: [id number]  Primary Care Physician:  Vito Schreiber MD  Date of admission: 2024  Date and Time of Service: 2024 at 2315    Subjective      Chief Complaint: Dizziness    History of Present Illness: Vero Cadena is a 53 y.o. female with a previous medical history of Asthma, tobacco abuse, HTN, Hyperlipidemia, depression, seizures and DM who presented to [facility] on 2024 with dizziness that was present when she woke up on 2023.  She states that the room is spinning even without her moving her head.  She denies blurry vision, headaches, chest pain, weakness, trouble speaking.    At [facility], labwork is unremarkable. CT of the head showed no acute abnormality.  CTA of the head and neck showed no intracranial large vessel occlusion, mild bilateral proximal ICA stenosis is noted. EKG shows SR, HR 88.  She is afebrile, all vitals are stable.  Hospitalist was consulted for transfer to [facility] for an MRI of the brain and further management.    12 point ROS reviewed and negative except as mentioned above      Personal History     Past Medical History:   Diagnosis Date    Arthritis     lower back    Asthma     Depression     Diabetes mellitus     Epilepsy     High cholesterol     History of pneumonia     Hypertension     Recurrent boils     venessa inner thighs pt applies clindamycin topical and is taking doxycycline     Sleep apnea     no machine    Urinary incontinence        Past Surgical History:   Procedure Laterality Date    CYSTOSCOPY W/ BULKING AGENT INJECTION N/A 3/11/2016    Procedure: CYSTOSCOPY WITH COAPTITE INJECTION;  Surgeon: Luis Dumont MD;  Location: [facility];  Service:     LAPAROSCOPIC TUBAL LIGATION Bilateral     TRANSVAGINAL TAPING SUSPENSION         Family History: family history is not on file. Otherwise pertinent FHx was reviewed and  not pertinent to current issue.    Social History:  reports that she has been smoking cigarettes. She has a 45.00 pack-year smoking history. She has been exposed to tobacco smoke. She does not have any smokeless tobacco history on file. She reports that she does not currently use alcohol. She reports that she does not use drugs.    Home Medications:  Prior to Admission Medications       Prescriptions Last Dose Informant Patient Reported? Taking?    acetaminophen (TYLENOL) 500 MG tablet   Yes No    Take 500 mg by mouth every 4 (four) hours as needed for mild pain (1-3).    albuterol (PROVENTIL HFA;VENTOLIN HFA) 108 (90 BASE) MCG/ACT inhaler   Yes No    Inhale 2 puffs every 4 (four) hours as needed for wheezing.    ARIPiprazole (ABILIFY) 2 MG tablet   Yes No    Take 2 mg by mouth every morning.    atorvastatin (LIPITOR) 20 MG tablet   Yes No    Take 20 mg by mouth every morning.    azithromycin (Zithromax Z-Wyatt) 250 MG tablet   No No    Take 2 tablets by mouth on day 1, then 1 tablet daily on days 2-5    clindamycin (CLEOCIN T) 1 % swab   Yes No    Apply 1 application topically 2 (two) times a day.    divalproex (DEPAKOTE) 500 MG EC tablet   Yes No    Take 1,000 mg by mouth every morning.    doxycycline (DORYX) 100 MG DR capsule   Yes No    Take 100 mg by mouth 2 (two) times a day.    escitalopram (LEXAPRO) 10 MG tablet   Yes No    Take 10 mg by mouth daily.    Fluticasone Furoate-Vilanterol 200-25 MCG/INH aerosol powder    Yes No    Inhale 1 puff every night.    HYDROcodone-acetaminophen (NORCO) 5-325 MG per tablet   No No    Take 1 tablet by mouth every 6 (six) hours as needed for severe pain (7-10).    lisinopril-hydrochlorothiazide (PRINZIDE,ZESTORETIC) 20-12.5 MG per tablet   Yes No    Take 1 tablet by mouth every morning.    meloxicam (MOBIC) 7.5 MG tablet   Yes No    Take 7.5 mg by mouth daily.    methocarbamol (ROBAXIN) 500 MG tablet   No No    Take 1 tablet by mouth 4 (four) times a day as needed for muscle  spasms.    methylphenidate (CONCERTA) 27 MG CR tablet   Yes No    Take 27 mg by mouth. 4 am    methylphenidate (RITALIN) 20 MG tablet   Yes No    Take 20 mg by mouth. 2 pm    Mirabegron ER 50 MG tablet sustained-release 24 hour   Yes No    Take 50 mg by mouth every morning.    montelukast (SINGULAIR) 10 MG tablet   Yes No    Take 10 mg by mouth every night.              Allergies:  Allergies   Allergen Reactions    Amoxicillin-Pot Clavulanate Other (See Comments)     Bad yeast infection       Objective      Vitals:   Temp:  [97.7 °F (36.5 °C)-98.6 °F (37 °C)] 97.7 °F (36.5 °C)  Heart Rate:  [64-97] 83  Resp:  [13-19] 17  BP: ()/() 105/69  Body mass index is 36.15 kg/m².  Physical Exam  Vitals and nursing note reviewed.   Constitutional:       Appearance: Normal appearance.   HENT:      Head: Normocephalic and atraumatic.      Nose: Nose normal.      Mouth/Throat:      Mouth: Mucous membranes are moist.   Eyes:      Extraocular Movements: Extraocular movements intact.      Pupils: Pupils are equal, round, and reactive to light.   Cardiovascular:      Rate and Rhythm: Normal rate and regular rhythm.      Pulses: Normal pulses.      Heart sounds: Normal heart sounds.   Pulmonary:      Effort: Pulmonary effort is normal.      Breath sounds: Normal breath sounds.   Abdominal:      General: Bowel sounds are normal.      Palpations: Abdomen is soft.   Musculoskeletal:         General: Normal range of motion.      Cervical back: Normal range of motion.   Skin:     General: Skin is warm and dry.   Neurological:      General: No focal deficit present.      Mental Status: She is alert and oriented to person, place, and time. Mental status is at baseline.   Psychiatric:         Mood and Affect: Mood normal.         Behavior: Behavior normal.           Assessment & Plan        This is a 53 y.o. female with:    Active and Resolved Problems  Active Hospital Problems    Diagnosis  POA    **Vertigo [R42]  Yes    Diabetes  mellitus type 2 in obese [E11.69, E66.9]  Yes    Smoker [F17.200]  Yes    Essential hypertension [I10]  Yes    Depression [F32.A]  Yes    Seizure disorder [G40.909]  Yes      Resolved Hospital Problems   No resolved problems to display.       Plan:    Home medications not verified at the time of assessment and plan    Vertigo  -CT of the head reviewed, no acute process  -CTA of head and neck reviewed, no large vessel occlusion, mild bilateral proximal ICA stenosis  -Meclizine prn  -Neuro checks  -MRI of the brain ordered     Seizures  -Chronic, stable  -Continue Depakote    Depression  ADHD  -Chronic, stable  -Continue home Abilify, Lexapro  -Ritalin is nonformulary, may resume as patient supplied medication when verified    Chronic Asthma  Tobacco Abuse  -Not in exacerbation  -Continue home Albuterol, Fluticasone, Singulair  -Encourage cessation  -Nicotine patch    Essential Hypertension  -Controlled  -Monitor BP  -Continue home Lisinopril, Hydrochlorothiazide    Hyperlipidemia  -Continue atorvastatin    DVT prophylaxis:  Mechanical DVT prophylaxis orders are present.    CODE STATUS:    Code Status (Patient has no pulse and is not breathing): CPR (Attempt to Resuscitate)  Medical Interventions (Patient has pulse or is breathing): Full Support        Admission Status:  I believe this patient meets observation status.    I discussed the patient's findings and my recommendations with patient.    Signature:     This document has been electronically signed by Grecia Castellano DNP, APRN on January 1, 2024 23:48 [facility] Hospitalist Team

## 2024-01-02 NOTE — ED NOTES
Updated pt and family about admision. IV site is wrapped and gave pt their paper work to give to registration

## 2024-01-02 NOTE — PROGRESS NOTES
[facility] MEDICINE SERVICE  DAILY PROGRESS NOTE    NAME: Vero Cadena  : 1970  MRN: [id number]      LOS: 0 days     PROVIDER OF SERVICE: Lucy Sheets MD    Chief Complaint: Vertigo    Subjective:     Interval History:  History taken from: patient  Patient Complaints: vertigo dizziness and nausea  Patient Denies: Chest pain or headaches    Review of Systems:   Review of Systems  10 point ros is negative other than what is stated positive above   Objective:     Vital Signs  Temp:  [97.7 °F (36.5 °C)-98.6 °F (37 °C)] 97.8 °F (36.6 °C)  Heart Rate:  [64-97] 70  Resp:  [10-19] 18  BP: ()/() 107/76   Body mass index is 36.15 kg/m².    Physical Exam  Physical Exam  Gen: NAD.  Obese  HEENT: nc/at/naresh   Chest : cta b/l  CVS : s1, s2 normal, rrr  Abdomen: soft, NT, ND bs positive  Extremities: 2 + pulses, no oedema  Neuro: AAO*3, grossly normal  Psych, calm and cooperative    Scheduled Meds   divalproex, 1,000 mg, Oral, Daily  lisinopril, 20 mg, Oral, Q24H   And  hydroCHLOROthiazide, 12.5 mg, Oral, Q24H  LORazepam, 1 mg, Oral, Once  pantoprazole, 40 mg, Oral, QAM AC  rosuvastatin, 40 mg, Oral, Daily  sertraline, 100 mg, Oral, Daily  sodium chloride, 10 mL, Intravenous, Q12H  sulfamethoxazole-trimethoprim, 1 tablet, Oral, BID       PRN Meds     acetaminophen **OR** acetaminophen **OR** acetaminophen    albuterol    senna-docusate sodium **AND** polyethylene glycol **AND** bisacodyl **AND** bisacodyl    diphenhydrAMINE    meclizine    melatonin    nitroglycerin    ondansetron    sodium chloride    sodium chloride   Infusions         Diagnostic Data    Results from last 7 days   Lab Units 24  0309   WBC 10*3/mm3 8.10   HEMOGLOBIN g/dL 14.3   HEMATOCRIT % 43.3   PLATELETS 10*3/mm3 231   GLUCOSE mg/dL 110*   CREATININE mg/dL 0.90   BUN mg/dL 19   SODIUM mmol/L 132*   POTASSIUM mmol/L 3.6   ANION GAP mmol/L 10.0       CT Angiogram Head    Result Date: 2024  Impression: 1. No evidence of  large vessel occlusion within the intracranial vasculature. 2. Mild bilateral proximal internal carotid artery stenosis. 3. No evidence of vertebral artery dissection or stenosis. Electronically Signed: Brigido Esther  1/1/2024 8:38 PM EST  Workstation ID: [id number]    CT Angiogram Neck    Result Date: 1/1/2024  Impression: 1. No evidence of large vessel occlusion within the intracranial vasculature. 2. Mild bilateral proximal internal carotid artery stenosis. 3. No evidence of vertebral artery dissection or stenosis. Electronically Signed: Brigido Santana  1/1/2024 8:38 PM EST  Workstation ID: [id number]    CT Head Without Contrast    Result Date: 1/1/2024  Impression: No acute intracranial abnormality. Electronically Signed: Tejas Anders MD  1/1/2024 7:25 PM EST  Workstation ID: [id number]       I reviewed the patient's new clinical results.    Assessment/Plan:     Active and Resolved Problems  Active Hospital Problems    Diagnosis  POA    **Vertigo [R42]  Yes    Diabetes mellitus type 2 in obese [E11.69, E66.9]  Yes    Smoker [F17.200]  Yes    Essential hypertension [I10]  Yes    Depression [F32.A]  Yes    Seizure disorder [G40.909]  Yes      Resolved Hospital Problems   No resolved problems to display.     Dizziness and vertigo  CT head negative, MRI of the brain has been ordered.  Patient states that she will likely require an open MRI however will attempt with Ativan and lactulose to manage since she is claustrophobic.  Neurology has been consulted  Check orthostatics  PT OT eval  Check echocardiogram  Patient states that she was dizzy earlier even in bed when lying without moving and also when she got up.  However that has improved since yesterday and now she is only dizzy and experiences vertigo when she gets up.  She denies any upper respiratory tract symptoms.  Denies any inner ear problems.  She did receive 1 dose of Antivert yesterday.    Urinary tract infection  On Bactrim  Continue, monitor urine  cultures    Hyperlipidemia  Continue Crestor    Hypertension  On lisinopril hydrochlorothiazide    History of seizure disorder  On Depakote      DVT prophylaxis:  Mechanical DVT prophylaxis orders are present.     Code status is   Code Status and Medical Interventions:   Ordered at: 01/01/24 2248     Code Status (Patient has no pulse and is not breathing):    CPR (Attempt to Resuscitate)     Medical Interventions (Patient has pulse or is breathing):    Full Support       Plan for disposition: 2-3 days     Time: 30 minutes    Signature: Electronically signed by Lucy Sheets MD, 01/02/24, 10:19 [facility].  [facility] Hospitalist Team

## 2024-01-03 LAB
ALBUMIN SERPL-MCNC: 4.7 G/DL (ref 3.5–5.2)
ALBUMIN/GLOB SERPL: 1.4 G/DL
ALP SERPL-CCNC: 70 U/L (ref 39–117)
ALT SERPL W P-5'-P-CCNC: 11 U/L (ref 1–33)
AMMONIA BLD-SCNC: 34 UMOL/L (ref 11–51)
ANION GAP SERPL CALCULATED.3IONS-SCNC: 16 MMOL/L (ref 5–15)
AST SERPL-CCNC: 17 U/L (ref 1–32)
BASOPHILS # BLD AUTO: 0.1 10*3/MM3 (ref 0–0.2)
BASOPHILS NFR BLD AUTO: 0.8 % (ref 0–1.5)
BILIRUB SERPL-MCNC: 0.3 MG/DL (ref 0–1.2)
BUN SERPL-MCNC: 17 MG/DL (ref 6–20)
BUN/CREAT SERPL: 20 (ref 7–25)
CALCIUM SPEC-SCNC: 10.1 MG/DL (ref 8.6–10.5)
CHLORIDE SERPL-SCNC: 98 MMOL/L (ref 98–107)
CO2 SERPL-SCNC: 22 MMOL/L (ref 22–29)
CREAT SERPL-MCNC: 0.85 MG/DL (ref 0.57–1)
DEPRECATED RDW RBC AUTO: 48.6 FL (ref 37–54)
EGFRCR SERPLBLD CKD-EPI 2021: 82 ML/MIN/1.73
EOSINOPHIL # BLD AUTO: 0.2 10*3/MM3 (ref 0–0.4)
EOSINOPHIL NFR BLD AUTO: 1.7 % (ref 0.3–6.2)
ERYTHROCYTE [DISTWIDTH] IN BLOOD BY AUTOMATED COUNT: 15 % (ref 12.3–15.4)
GLOBULIN UR ELPH-MCNC: 3.3 GM/DL
GLUCOSE SERPL-MCNC: 114 MG/DL (ref 65–99)
HCT VFR BLD AUTO: 45.6 % (ref 34–46.6)
HGB BLD-MCNC: 15.1 G/DL (ref 12–15.9)
LYMPHOCYTES # BLD AUTO: 3.4 10*3/MM3 (ref 0.7–3.1)
LYMPHOCYTES NFR BLD AUTO: 33.5 % (ref 19.6–45.3)
MAGNESIUM SERPL-MCNC: 2 MG/DL (ref 1.6–2.6)
MCH RBC QN AUTO: 31.1 PG (ref 26.6–33)
MCHC RBC AUTO-ENTMCNC: 33.2 G/DL (ref 31.5–35.7)
MCV RBC AUTO: 93.7 FL (ref 79–97)
MONOCYTES # BLD AUTO: 0.9 10*3/MM3 (ref 0.1–0.9)
MONOCYTES NFR BLD AUTO: 9.1 % (ref 5–12)
NEUTROPHILS NFR BLD AUTO: 5.5 10*3/MM3 (ref 1.7–7)
NEUTROPHILS NFR BLD AUTO: 54.9 % (ref 42.7–76)
NRBC BLD AUTO-RTO: 0.1 /100 WBC (ref 0–0.2)
PHOSPHATE SERPL-MCNC: 2.6 MG/DL (ref 2.5–4.5)
PLATELET # BLD AUTO: 257 10*3/MM3 (ref 140–450)
PMV BLD AUTO: 9.7 FL (ref 6–12)
POTASSIUM SERPL-SCNC: 4.5 MMOL/L (ref 3.5–5.2)
PROT SERPL-MCNC: 8 G/DL (ref 6–8.5)
RBC # BLD AUTO: 4.86 10*6/MM3 (ref 3.77–5.28)
SODIUM SERPL-SCNC: 136 MMOL/L (ref 136–145)
VIT B12 BLD-MCNC: 650 PG/ML (ref 211–946)
WBC NRBC COR # BLD AUTO: 10.1 10*3/MM3 (ref 3.4–10.8)

## 2024-01-03 PROCEDURE — G0378 HOSPITAL OBSERVATION PER HR: HCPCS

## 2024-01-03 PROCEDURE — 83735 ASSAY OF MAGNESIUM: CPT | Performed by: HOSPITALIST

## 2024-01-03 PROCEDURE — 84100 ASSAY OF PHOSPHORUS: CPT | Performed by: HOSPITALIST

## 2024-01-03 PROCEDURE — 80053 COMPREHEN METABOLIC PANEL: CPT | Performed by: HOSPITALIST

## 2024-01-03 PROCEDURE — 82140 ASSAY OF AMMONIA: CPT | Performed by: NURSE PRACTITIONER

## 2024-01-03 PROCEDURE — 95992 CANALITH REPOSITIONING PROC: CPT

## 2024-01-03 PROCEDURE — 97116 GAIT TRAINING THERAPY: CPT

## 2024-01-03 PROCEDURE — 97162 PT EVAL MOD COMPLEX 30 MIN: CPT

## 2024-01-03 PROCEDURE — 85025 COMPLETE CBC W/AUTO DIFF WBC: CPT

## 2024-01-03 RX ORDER — LEVETIRACETAM 500 MG/1
500 TABLET ORAL 2 TIMES DAILY
Status: DISCONTINUED | OUTPATIENT
Start: 2024-01-03 | End: 2024-01-04 | Stop reason: HOSPADM

## 2024-01-03 RX ORDER — POLYETHYLENE GLYCOL 3350 17 G/17G
17 POWDER, FOR SOLUTION ORAL DAILY
Status: DISCONTINUED | OUTPATIENT
Start: 2024-01-03 | End: 2024-01-04 | Stop reason: HOSPADM

## 2024-01-03 RX ORDER — DIVALPROEX SODIUM 500 MG/1
500 TABLET, DELAYED RELEASE ORAL DAILY
Status: DISCONTINUED | OUTPATIENT
Start: 2024-01-04 | End: 2024-01-04 | Stop reason: HOSPADM

## 2024-01-03 RX ADMIN — PANTOPRAZOLE SODIUM 40 MG: 40 TABLET, DELAYED RELEASE ORAL at 10:19

## 2024-01-03 RX ADMIN — Medication 10 ML: at 20:04

## 2024-01-03 RX ADMIN — POLYETHYLENE GLYCOL 3350 17 G: 17 POWDER, FOR SOLUTION ORAL at 19:20

## 2024-01-03 RX ADMIN — DIVALPROEX SODIUM 1000 MG: 500 TABLET, DELAYED RELEASE ORAL at 10:19

## 2024-01-03 RX ADMIN — SULFAMETHOXAZOLE AND TRIMETHOPRIM 1 TABLET: 800; 160 TABLET ORAL at 10:19

## 2024-01-03 RX ADMIN — ROSUVASTATIN 40 MG: 10 TABLET, FILM COATED ORAL at 10:19

## 2024-01-03 RX ADMIN — Medication 10 ML: at 10:45

## 2024-01-03 RX ADMIN — HYDROCHLOROTHIAZIDE 12.5 MG: 12.5 TABLET ORAL at 10:19

## 2024-01-03 RX ADMIN — SERTRALINE 100 MG: 100 TABLET, FILM COATED ORAL at 10:19

## 2024-01-03 RX ADMIN — LISINOPRIL 20 MG: 20 TABLET ORAL at 10:19

## 2024-01-03 RX ADMIN — SULFAMETHOXAZOLE AND TRIMETHOPRIM 1 TABLET: 800; 160 TABLET ORAL at 20:04

## 2024-01-03 NOTE — PLAN OF CARE
"Goal Outcome Evaluation:  Plan of Care Reviewed With: patient, sibling        Progress: no change  Outcome Evaluation: Pt is a 52 y/o F who presented to [facility] w/ c/o dizziness w/ positional changes accompanied by nausea/vomitting, reports of muscle cramping, and intermittent confusion. Pt reports she recently had a UTI and w/ hx of epilepsy (has not had a seizure since 2003), DM type II, asthma, tobacco abuse, HTN, and HLD. CT head/MRI brain (-) for acute findings, CTA head/neck w/ mild bilateral paroxysmal ICA stenosis. At baseline, pt lives w/ her boyfriend and was independent w/ all mobility/ADLs w/ no AD, denies falls. Pt reports she woke up Sunday, December 31, 2023 w/ mild confusion and dizziness that has not resolved and feels like room-spinning dizziness. Oakley-Hallpike Test completed revealing right, upbeating, torsional nystagmus (+) R posterior canal BPPV. PT completed x 3 canolith repositioning manuvers with improvement in symptoms and resolution of nystagmus. Pt required SBA for bed mobility/transfers, initially required CGA for gait training with improvement s/p repositioning manuvers to require only SBA (50 ft x 3 w/ no AD). Pt reports feeling \"so much better but still a little fuzzy\". Pt reports she was unable to \"walk in a straight line\" prior to session and able to ambulate with minimal lateral veering or unsteadiness noted towards end of bout. Anticipate pt will be safe to d/c home w/ significant other and follow-up with OP PT for canolith repositioning/vestibular rehab as needed. Plan to follow up for 1-2 additional sessions to monitor/treat as needed. PPE: gloves, mask      Anticipated Discharge Disposition (PT): home with outpatient therapy services  "

## 2024-01-03 NOTE — THERAPY EVALUATION
Patient Name: Vero Cadena  : 1970    MRN: [id number]                              Today's Date: 1/3/2024       Admit Date: 2024    Visit Dx:     ICD-10-CM ICD-9-CM   1. Vertigo  R42 780.4     Patient Active Problem List   Diagnosis    Urethral sphincter deficiency, intrinsic (ISD)    Vertigo    Depression    Diabetes mellitus type 2 in obese    Essential hypertension    Smoker    Seizure disorder     Past Medical History:   Diagnosis Date    Arthritis     lower back    Asthma     Depression     Diabetes mellitus     Epilepsy     High cholesterol     History of pneumonia     Hypertension     Recurrent boils     venessa inner thighs pt applies clindamycin topical and is taking doxycycline     Sleep apnea     no machine    Urinary incontinence      Past Surgical History:   Procedure Laterality Date    CYSTOSCOPY W/ BULKING AGENT INJECTION N/A 3/11/2016    Procedure: CYSTOSCOPY WITH COAPTITE INJECTION;  Surgeon: Luis Dumont MD;  Location: [facility];  Service:     LAPAROSCOPIC TUBAL LIGATION Bilateral     TRANSVAGINAL TAPING SUSPENSION        General Information       Row Name 24 1338          Physical Therapy Time and Intention    Document Type evaluation  -AO     Mode of Treatment physical therapy  -AO       Row Name 24 133          General Information    Patient Profile Reviewed yes  -AO     Prior Level of Function --  Previously independent w/ all mobility/ADLs w/ no AD, still drives, denies falls  -AO     Existing Precautions/Restrictions no known precautions/restrictions  -AO     Barriers to Rehab none identified  -AO       Row Name 24 1338          Living Environment    People in Home other (see comments)  Boyfriend  -AO       Row Name 24 1333          Cognition    Orientation Status (Cognition) oriented x 4  -AO       Row Name 24 1336          Safety Issues, Functional Mobility    Impairments Affecting Function (Mobility) balance;visual/perceptual  -AO        "        User Key  (r) = Recorded By, (t) = Taken By, (c) = Cosigned By      Initials Name Provider Type    Surekha Hill, PT Physical Therapist                   Mobility       Row Name 01/03/24 1338          Bed Mobility    Bed Mobility bed mobility (all) activities  -AO     All Activities, [place] (Bed Mobility) standby assist  -AO       Row Name 01/03/24 1338          Sit-Stand Transfer    Sit-Stand [place] (Transfers) standby assist  -AO       Row Name 01/03/24 1338          Gait/Stairs (Locomotion)    [place] Level (Gait) standby assist  -AO     Distance in Feet (Gait) 50 ft x 4  -AO     Comment, (Gait/Stairs) Lateral veering/\"furtniture cruising\" during first bout that improved with each bout of Canolith repositioning manuver  -AO               User Key  (r) = Recorded By, (t) = Taken By, (c) = Cosigned By      Initials Name Provider Type    Surekha Hill, PT Physical Therapist                   Obj/Interventions       Row Name 01/03/24 1338          Range of Motion Comprehensive    General Range of Motion no range of motion deficits identified  -AO       Row Name 01/03/24 1338          Strength Comprehensive (MMT)    General Manual Muscle Testing (MMT) Assessment no strength deficits identified  -AO       Row Name 01/03/24 1338          Balance    Balance Assessment sitting static balance;sitting dynamic balance;sit to stand dynamic balance;standing static balance;standing dynamic balance  -AO     Static Sitting Balance independent  -AO     Dynamic Sitting Balance independent  -AO     Position, Sitting Balance unsupported;sitting edge of bed  -AO     Sit to Stand Dynamic Balance standby assist  -AO     Static Standing Balance standby assist  -AO     Dynamic Standing Balance contact guard  -AO     Position/Device Used, Standing Balance unsupported  -AO     Comment, Balance [place]-Hallpike Test completed revealing right, upbeating, torsional nystagmus(+) R posterior canal BPPV, PT " completed x 3 canolith repositioning manuvers with improvement in symptoms and resolution of nystagmus  -AO       Row Name 01/03/24 1333          Sensory Assessment (Somatosensory)    Sensory Assessment (Somatosensory) sensation intact  -AO               User Key  (r) = Recorded By, (t) = Taken By, (c) = Cosigned By      Initials Name Provider Type    AO Surekha Benedict R, PT Physical Therapist                   Goals/Plan       Row Name 01/03/24 1332          Bed Mobility Goal 1 (PT)    Activity/Assistive Device (Bed Mobility Goal 1, PT) bed mobility activities, all  -AO     [place]oper Level/Cues Needed (Bed Mobility Goal 1, PT) independent  -AO     Time Frame (Bed Mobility Goal 1, PT) long term goal (LTG);2 weeks  -AO     Progress/Outcomes (Bed Mobility Goal 1, PT) goal not met  -AO       Row Name 01/03/24 1239          Transfer Goal 1 (PT)    Activity/Assistive Device (Transfer Goal 1, PT) transfers, all  -AO     [place] Level/Cues Needed (Transfer Goal 1, PT) independent  -AO     Time Frame (Transfer Goal 1, PT) long term goal (LTG);2 weeks  -AO     Progress/Outcome (Transfer Goal 1, PT) goal not met  -AO       Row Name 01/03/24 1332          Gait Training Goal 1 (PT)    Activity/Assistive Device (Gait Training Goal 1, PT) gait (walking locomotion)  -AO     [place] Level (Gait Training Goal 1, PT) independent  -AO     Distance (Gait Training Goal 1, PT) 400 ft  -AO     Time Frame (Gait Training Goal 1, PT) long term goal (LTG);2 weeks  -AO     Progress/Outcome (Gait Training Goal 1, PT) goal not met  -AO       Row Name 01/03/24 0985          Therapy Assessment/Plan (PT)    Planned Therapy Interventions (PT) balance training;bed mobility training;gait training;home exercise program;neuromuscular re-education;patient/family education;strengthening;vestibular therapy;transfer training  -AO               User Key  (r) = Recorded By, (t) = Taken By, (c) = Cosigned By      Initials Name Provider Type    AO  "Surekha Benedict, PT Physical Therapist                   Clinical Impression       Row Name 01/03/24 1338          Pain    Pretreatment Pain Rating 0/10 - no pain  -AO     Posttreatment Pain Rating 0/10 - no pain  -AO       Row Name 01/03/24 1338          Plan of Care Review    Plan of Care Reviewed With patient;sibling  -AO     Progress no change  -AO     Outcome Evaluation Pt is a 52 y/o F who presented to [facility] w/ c/o dizziness w/ positional changes accompanied by nausea/vomitting, reports of muscle cramping, and intermittent confusion. Pt reports she recently had a UTI and w/ hx of epilepsy (has not had a seizure since 2003), DM type II, asthma, tobacco abuse, HTN, and HLD. CT head/MRI brain (-) for acute findings, CTA head/neck w/ mild bilateral paroxysmal ICA stenosis. At baseline, pt lives w/ her boyfriend and was independent w/ all mobility/ADLs w/ no AD, denies falls. Pt reports she woke up Sunday, December 31, 2023 w/ mild confusion and dizziness that has not resolved and feels like room-spinning dizziness. [place]-Hallpike Test completed revealing right, upbeating, torsional nystagmus (+) R posterior canal BPPV. PT completed x 3 canolith repositioning manuvers with improvement in symptoms and resolution of nystagmus. Pt required SBA for bed mobility/transfers, initially required CGA for gait training with improvement s/p repositioning manuvers to require only SBA (50 ft x 3 w/ no AD). Pt reports feeling \"so much better but still a little fuzzy\". Pt reports she was unable to \"walk in a straight line\" prior to session and able to ambulate with minimal lateral veering or unsteadiness noted towards end of bout. Anticipate pt will be safe to d/c home w/ significant other and follow-up with OP PT for canolith repositioning/vestibular rehab as needed. Plan to follow up for 1-2 additional sessions to monitor/treat as needed. PPE: gloves, mask  -AO       Row Name 01/03/24 133          Therapy Assessment/Plan (PT)    " Rehab Potential (PT) good, to achieve stated therapy goals  -AO     Criteria for Skilled Interventions Met (PT) yes;meets criteria;skilled treatment is necessary  -AO     Therapy Frequency (PT) 3 times/wk  -AO     Predicted Duration of Therapy Intervention (PT) until d/c  -AO       Row Name 01/03/24 1338          Vital Signs    Recovery Time vitals stable and WNL on RA throughout session  -AO       Row Name 01/03/24 1338          Positioning and Restraints    Pre-Treatment Position in bed  -AO     Post Treatment Position bed  -AO     In Bed sitting EOB;call light within reach;encouraged to call for assist  -AO               User Key  (r) = Recorded By, (t) = Taken By, (c) = Cosigned By      Initials Name Provider Type    AO Surekha Benedict, PT Physical Therapist                   Outcome Measures    No documentation.                                Physical Therapy Education       Title: PT OT SLP Therapies (Done)       Topic: Physical Therapy (Done)       Point: Mobility training (Done)       Learning Progress Summary             Patient Acceptance, E,TB, VU by AO at 1/3/2024 1450                                         User Key       Initials Effective Dates Name Provider Type Discipline    AO 06/16/21 -  Surekha Benedict, PT Physical Therapist PT                  PT Recommendation and Plan  Planned Therapy Interventions (PT): balance training, bed mobility training, gait training, home exercise program, neuromuscular re-education, patient/family education, strengthening, vestibular therapy, transfer training  Plan of Care Reviewed With: patient, sibling  Progress: no change  Outcome Evaluation: Pt is a 54 y/o F who presented to [facility] w/ c/o dizziness w/ positional changes accompanied by nausea/vomitting, reports of muscle cramping, and intermittent confusion. Pt reports she recently had a UTI and w/ hx of epilepsy (has not had a seizure since 2003), DM type II, asthma, tobacco abuse, HTN, and HLD. CT head/MRI brain  "(-) for acute findings, CTA head/neck w/ mild bilateral paroxysmal ICA stenosis. At baseline, pt lives w/ her boyfriend and was independent w/ all mobility/ADLs w/ no AD, denies falls. Pt reports she woke up Sunday, December 31, 2023 w/ mild confusion and dizziness that has not resolved and feels like room-spinning dizziness. [place]-Hallpike Test completed revealing right, upbeating, torsional nystagmus (+) R posterior canal BPPV. PT completed x 3 canolith repositioning manuvers with improvement in symptoms and resolution of nystagmus. Pt required SBA for bed mobility/transfers, initially required CGA for gait training with improvement s/p repositioning manuvers to require only SBA (50 ft x 3 w/ no AD). Pt reports feeling \"so much better but still a little fuzzy\". Pt reports she was unable to \"walk in a straight line\" prior to session and able to ambulate with minimal lateral veering or unsteadiness noted towards end of bout. Anticipate pt will be safe to d/c home w/ significant other and follow-up with OP PT for canolith repositioning/vestibular rehab as needed. Plan to follow up for 1-2 additional sessions to monitor/treat as needed. PPE: gloves, mask     Time Calculation:   PT Evaluation Complexity  History, PT Evaluation Complexity: 3 or more personal factors and/or comorbidities  Examination of Body Systems (PT Eval Complexity): total of 3 or more elements  Clinical Presentation (PT Evaluation Complexity): evolving  Clinical Decision Making (PT Evaluation Complexity): moderate complexity  Overall Complexity (PT Evaluation Complexity): moderate complexity     PT Charges       Row Name 01/03/24 1451             Time Calculation    Start Time 1338  -AO      Stop Time 1425  -AO      Time Calculation (min) 47 min  -AO      PT Received On 01/03/24  -AO      PT - Next Appointment 01/04/24  -AO      PT Goal Re-Cert Due Date 01/17/24  -AO         Time Calculation- PT    Total Timed Code Minutes- PT 25 minute(s)  -AO        "         User Key  (r) = Recorded By, (t) = Taken By, (c) = Cosigned By      Initials Name Provider Type    AO Surekha Benedict, PT Physical Therapist                  Therapy Charges for Today       Code Description Service Date Service Provider Modifiers Qty    31827016173 HC PT EVAL MOD COMPLEXITY 4 1/3/2024 Surekha Benedict, PT GP 1    20502272618 HC GAIT TRAINING EA 15 MIN 1/3/2024 Surekha Benedict, PT GP 1    [id number]  PT CANALITH REPOSITIONING PER DAY 1/3/2024 Surekha Benedict, PT GP 1            PT G-Codes  AM-PAC 6 Clicks Score (PT): 24  PT Discharge Summary  Anticipated Discharge Disposition (PT): home with outpatient therapy services    Surekha Benedict, PT  1/3/2024

## 2024-01-03 NOTE — PLAN OF CARE
Goal Outcome Evaluation:                 Patient sleeping between care. Patient stable at this time.

## 2024-01-03 NOTE — CASE MANAGEMENT/SOCIAL WORK
Discharge Planning Assessment  [facility]     Patient Name: Vero Cadena  MRN: [id number]  Today's Date: 1/3/2024    Admit Date: 1/1/2024    Plan: Anticipate routine home. OP PT referral Jesus Altamirano (order from MD pending).   Discharge Needs Assessment       Row Name 01/03/24 1437       Living Environment    People in Home significant other    Name(s) of People in Home Felice- S/o    Current Living Arrangements home    Potentially Unsafe Housing Conditions none    Primary Care Provided by self    Provides Primary Care For no one    Family Caregiver if Needed sibling(s)    Family Caregiver Names Samson Stone    Quality of Family Relationships supportive;involved;helpful    Able to Return to Prior Arrangements yes       Resource/Environmental Concerns    Resource/Environmental Concerns none    Transportation Concerns none       Transition Planning    Patient/Family Anticipates Transition to home    Patient/Family Anticipated Services at Transition outpatient care    Transportation Anticipated family or friend will provide       Discharge Needs Assessment    Readmission Within the Last 30 Days no previous admission in last 30 days    Equipment Currently Used at Home none    Concerns to be Addressed care coordination/care conferences;discharge planning    Anticipated Changes Related to Illness none    Outpatient/Agency/Support Group Needs outpatient therapy    Provided Post Acute Provider List? N/A                   Discharge Plan       Row Name 01/03/24 1438       Plan    Plan Anticipate routine home. OP PT referral Jesus Altamirano (order from MD pending).    Patient/Family in Agreement with Plan yes    Plan Comments CM discussed dc planning with patient. PCP and pharmacy confirmed, reported no trouble affording medications, and declined needs at this time for any DME. Reported that she is usually independent, works full-time. Reported that she worked with PT today and was interested in starting OP PT  again. Reported that she has previously been to OP PT at [facility] in [place] on [address] and interested in doing that again. CM added new referral in basket and requested order from MD.                  Continued Care and Services - Admitted Since 1/1/2024       Therapy       Service Provider Request Status Selected Services Address Phone Fax Patient Preferred    [facility] [place] Pending - Request Sent N/A [address] [place] [address] [place] [id number] [phone]                        Demographic Summary       Row Name 01/03/24 1436       General Information    Admission Type observation    Referral Source admission list    Reason for Consult discharge planning    Preferred Language English       Contact Information    Permission Granted to Share Info With                    Functional Status       Row Name 01/03/24 1436       Functional Status    Usual Activity Tolerance good    Current Activity Tolerance good       Functional Status, IADL    Medications independent    Meal Preparation independent    Housekeeping independent    Laundry independent    Shopping independent       Employment/    Employment Status employed full-time                  Shasta Roy RN     Office Phone: [phone]  Office Cell: [phone]

## 2024-01-03 NOTE — DISCHARGE PLACEMENT REQUEST
"Neno Huertas (53 y.o. Female)       Date of Birth   1970    Social Security Number       Address   [address] [place] [address]    Home Phone   [phone]    MRN   [id number]       Islam   None    Marital Status                               Admission Date   24    Admission Type   Urgent    Admitting Provider   Luis Cedillo DO    Attending Provider   Lucy Sheets MD    Department, Room/Bed   [facility] 3C MEDICAL INPATIENT, 382       Discharge Date       Discharge Disposition       Discharge Destination                                 Attending Provider: Lucy Sheets MD    Allergies: Amoxicillin-pot Clavulanate    Isolation: None   Infection: None   Code Status: CPR    Ht: 167.6 cm (66\")   Wt: 102 kg (225 lb)    Admission Cmt: None   Principal Problem: Vertigo [R42]                   Active Insurance as of 2024       Primary Coverage       Payor Plan Insurance Group Employer/Plan Group    ANTH BLUE CROSS [id number]       Payor Plan Address Payor Plan Phone Number Payor Plan Fax Number Effective Dates    [address]   1/10/2016 - None Entered    [place] [address]         Subscriber Name Subscriber Birth Date Member ID       NENO HUERTAS 1970 [id number]                     Emergency Contacts        (Rel.) Home Phone Work Phone Mobile Phone    Bertha Mistry (Sister) [phone] -- --    PietroFelice (Significant Other) [phone] -- --                 History & Physical        Grecia Castellano APRN at 24 [phone]       Attestation signed by Luis Cedillo DO at 24 9424    I have reviewed this documentation and agree.      Electronically signed by Luis Cedillo DO, 24, 5:04 AM [facility] Medicine Services  History & Physical    Patient Name: Neno Huertas  : 1970  MRN: [id number]  Primary Care Physician:  Vito Schreiber MD  Date of admission: " 1/1/2024  Date and Time of Service: 1/1/2024 at 2315    Subjective      Chief Complaint: Dizziness    History of Present Illness: Vero Cadena is a 53 y.o. female with a previous medical history of Asthma, tobacco abuse, HTN, Hyperlipidemia, depression, seizures and DM who presented to [facility] on 1/1/2024 with dizziness that was present when she woke up on 12/31/2023.  She states that the room is spinning even without her moving her head.  She denies blurry vision, headaches, chest pain, weakness, trouble speaking.    At [facility], labwork is unremarkable. CT of the head showed no acute abnormality.  CTA of the head and neck showed no intracranial large vessel occlusion, mild bilateral proximal ICA stenosis is noted. EKG shows SR, HR 88.  She is afebrile, all vitals are stable.  Hospitalist was consulted for transfer to [facility] for an MRI of the brain and further management.    12 point ROS reviewed and negative except as mentioned above      Personal History     Past Medical History:   Diagnosis Date    Arthritis     lower back    Asthma     Depression     Diabetes mellitus     Epilepsy     High cholesterol     History of pneumonia     Hypertension     Recurrent boils     venessa inner thighs pt applies clindamycin topical and is taking doxycycline     Sleep apnea     no machine    Urinary incontinence        Past Surgical History:   Procedure Laterality Date    CYSTOSCOPY W/ BULKING AGENT INJECTION N/A 3/11/2016    Procedure: CYSTOSCOPY WITH COAPTITE INJECTION;  Surgeon: Luis Dumont MD;  Location: [facility];  Service:     LAPAROSCOPIC TUBAL LIGATION Bilateral     TRANSVAGINAL TAPING SUSPENSION         Family History: family history is not on file. Otherwise pertinent FHx was reviewed and not pertinent to current issue.    Social History:  reports that she has been smoking cigarettes. She has a 45.00 pack-year smoking history. She has been exposed to tobacco smoke. She does  not have any smokeless tobacco history on file. She reports that she does not currently use alcohol. She reports that she does not use drugs.    Home Medications:  Prior to Admission Medications       Prescriptions Last Dose Informant Patient Reported? Taking?    acetaminophen (TYLENOL) 500 MG tablet   Yes No    Take 500 mg by mouth every 4 (four) hours as needed for mild pain (1-3).    albuterol (PROVENTIL HFA;VENTOLIN HFA) 108 (90 BASE) MCG/ACT inhaler   Yes No    Inhale 2 puffs every 4 (four) hours as needed for wheezing.    ARIPiprazole (ABILIFY) 2 MG tablet   Yes No    Take 2 mg by mouth every morning.    atorvastatin (LIPITOR) 20 MG tablet   Yes No    Take 20 mg by mouth every morning.    azithromycin (Zithromax Z-Wyatt) 250 MG tablet   No No    Take 2 tablets by mouth on day 1, then 1 tablet daily on days 2-5    clindamycin (CLEOCIN T) 1 % swab   Yes No    Apply 1 application topically 2 (two) times a day.    divalproex (DEPAKOTE) 500 MG EC tablet   Yes No    Take 1,000 mg by mouth every morning.    doxycycline (DORYX) 100 MG DR capsule   Yes No    Take 100 mg by mouth 2 (two) times a day.    escitalopram (LEXAPRO) 10 MG tablet   Yes No    Take 10 mg by mouth daily.    Fluticasone Furoate-Vilanterol 200-25 MCG/INH aerosol powder    Yes No    Inhale 1 puff every night.    HYDROcodone-acetaminophen (NORCO) 5-325 MG per tablet   No No    Take 1 tablet by mouth every 6 (six) hours as needed for severe pain (7-10).    lisinopril-hydrochlorothiazide (PRINZIDE,ZESTORETIC) 20-12.5 MG per tablet   Yes No    Take 1 tablet by mouth every morning.    meloxicam (MOBIC) 7.5 MG tablet   Yes No    Take 7.5 mg by mouth daily.    methocarbamol (ROBAXIN) 500 MG tablet   No No    Take 1 tablet by mouth 4 (four) times a day as needed for muscle spasms.    methylphenidate (CONCERTA) 27 MG CR tablet   Yes No    Take 27 mg by mouth. 4 am    methylphenidate (RITALIN) 20 MG tablet   Yes No    Take 20 mg by mouth. 2 pm    Mirabegron ER  50 MG tablet sustained-release 24 hour   Yes No    Take 50 mg by mouth every morning.    montelukast (SINGULAIR) 10 MG tablet   Yes No    Take 10 mg by mouth every night.              Allergies:  Allergies   Allergen Reactions    Amoxicillin-Pot Clavulanate Other (See Comments)     Bad yeast infection       Objective      Vitals:   Temp:  [97.7 °F (36.5 °C)-98.6 °F (37 °C)] 97.7 °F (36.5 °C)  Heart Rate:  [64-97] 83  Resp:  [13-19] 17  BP: ()/() 105/69  Body mass index is 36.15 kg/m².  Physical Exam  Vitals and nursing note reviewed.   Constitutional:       Appearance: Normal appearance.   HENT:      Head: Normocephalic and atraumatic.      Nose: Nose normal.      Mouth/Throat:      Mouth: Mucous membranes are moist.   Eyes:      Extraocular Movements: Extraocular movements intact.      Pupils: Pupils are equal, round, and reactive to light.   Cardiovascular:      Rate and Rhythm: Normal rate and regular rhythm.      Pulses: Normal pulses.      Heart sounds: Normal heart sounds.   Pulmonary:      Effort: Pulmonary effort is normal.      Breath sounds: Normal breath sounds.   Abdominal:      General: Bowel sounds are normal.      Palpations: Abdomen is soft.   Musculoskeletal:         General: Normal range of motion.      Cervical back: Normal range of motion.   Skin:     General: Skin is warm and dry.   Neurological:      General: No focal deficit present.      Mental Status: She is alert and oriented to person, place, and time. Mental status is at baseline.   Psychiatric:         Mood and Affect: Mood normal.         Behavior: Behavior normal.           Assessment & Plan        This is a 53 y.o. female with:    Active and Resolved Problems  Active Hospital Problems    Diagnosis  POA    **Vertigo [R42]  Yes    Diabetes mellitus type 2 in obese [E11.69, E66.9]  Yes    Smoker [F17.200]  Yes    Essential hypertension [I10]  Yes    Depression [F32.A]  Yes    Seizure disorder [G40.909]  Yes      Resolved  Hospital Problems   No resolved problems to display.       Plan:    Home medications not verified at the time of assessment and plan    Vertigo  -CT of the head reviewed, no acute process  -CTA of head and neck reviewed, no large vessel occlusion, mild bilateral proximal ICA stenosis  -Meclizine prn  -Neuro checks  -MRI of the brain ordered     Seizures  -Chronic, stable  -Continue Depakote    Depression  ADHD  -Chronic, stable  -Continue home Abilify, Lexapro  -Ritalin is nonformulary, may resume as patient supplied medication when verified    Chronic Asthma  Tobacco Abuse  -Not in exacerbation  -Continue home Albuterol, Fluticasone, Singulair  -Encourage cessation  -Nicotine patch    Essential Hypertension  -Controlled  -Monitor BP  -Continue home Lisinopril, Hydrochlorothiazide    Hyperlipidemia  -Continue atorvastatin    DVT prophylaxis:  Mechanical DVT prophylaxis orders are present.    CODE STATUS:    Code Status (Patient has no pulse and is not breathing): CPR (Attempt to Resuscitate)  Medical Interventions (Patient has pulse or is breathing): Full Support        Admission Status:  I believe this patient meets observation status.    I discussed the patient's findings and my recommendations with patient.    Signature:     This document has been electronically signed by Grecia Castellano DNP, APRN on January 1, 2024 23:48 [facility]    Electronically signed by Luis Cedillo DO at 01/02/24 1099

## 2024-01-03 NOTE — PLAN OF CARE
MRI brain did not show acute or subacute stroke.      Patient currently on Depakote at 1000 mg delayed release and has been stable with her seizures for the past 20 years.  Her ammonia level on this admission is 58.  She is having complaints of difficulty with thought process, word finding difficulty, and some unbalanced gait.  This is not her baseline.     Recommending to recheck her ammonia level this afternoon.  It is unfortunate but since patient is having elevated levels and she is showing some signs of hyperammonemia, recommend to decrease Depakote to 500 mg daily for 3 days before titrating off.  Will start Keppra 500 mg twice daily.  Patient will need to follow-up with her established neurologist when she is discharged.

## 2024-01-03 NOTE — PLAN OF CARE
MRI brain reviewed- no acute/subacute stroke. PT evaluation suggest right posterior canal BPPV. There is nothing that suggest stroke or TIA.     Pt is currently on Depakote 1000 mg DR QD. VPA level is normal, NH3 58 is high. Ordered to recheck level. If elevated, patient should be weaned off Depakote and another AED can be started. This should be done with patient's established neurologist so she can contact them with any problems.  We are available with any questions/concerns. Thank you.

## 2024-01-03 NOTE — PROGRESS NOTES
[facility] MEDICINE SERVICE  DAILY PROGRESS NOTE    NAME: Vero Cadena  : 1970  MRN: [id number]      LOS: 0 days     PROVIDER OF SERVICE: Lucy Sheets MD    Chief Complaint: Vertigo    Subjective:     Interval History:  History taken from: patient  Patient Complaints: vertigo dizziness and nausea  Patient Denies: Chest pain or headaches  Patient states that she does not remember neurology stimulator.  She did get an MRI of the brain the results of which are pending    Review of Systems:   Review of Systems  10 point ros is negative other than what is stated positive above   Objective:     Vital Signs  Temp:  [97.3 °F (36.3 °C)-97.9 °F (36.6 °C)] 97.9 °F (36.6 °C)  Heart Rate:  [66-82] 82  Resp:  [11-19] 18  BP: ()/(49-74) 96/64   Body mass index is 36.32 kg/m².    Physical Exam  Physical Exam  Gen: NAD.  Obese  HEENT: nc/at/naresh   Chest : cta b/l  CVS : s1, s2 normal, rrr  Abdomen: soft, NT, ND bs positive  Extremities: 2 + pulses, no oedema  Neuro: AAO*3, grossly normal  Psych, calm and cooperative    Scheduled Meds   divalproex, 1,000 mg, Oral, Daily  lisinopril, 20 mg, Oral, Q24H   And  hydroCHLOROthiazide, 12.5 mg, Oral, Q24H  pantoprazole, 40 mg, Oral, QAM AC  rosuvastatin, 40 mg, Oral, Daily  sertraline, 100 mg, Oral, Daily  sodium chloride, 10 mL, Intravenous, Q12H  sulfamethoxazole-trimethoprim, 1 tablet, Oral, BID       PRN Meds     acetaminophen **OR** acetaminophen **OR** acetaminophen    albuterol    senna-docusate sodium **AND** polyethylene glycol **AND** bisacodyl **AND** bisacodyl    diphenhydrAMINE    meclizine    melatonin    nitroglycerin    ondansetron    sodium chloride    sodium chloride   Infusions         Diagnostic Data    Results from last 7 days   Lab Units 24  0309   WBC 10*3/mm3 8.10   HEMOGLOBIN g/dL 14.3   HEMATOCRIT % 43.3   PLATELETS 10*3/mm3 231   GLUCOSE mg/dL 110*   CREATININE mg/dL 0.90   BUN mg/dL 19   SODIUM mmol/L 132*   POTASSIUM mmol/L 3.6    ANION GAP mmol/L 10.0       CT Angiogram Head    Result Date: 1/1/2024  Impression: 1. No evidence of large vessel occlusion within the intracranial vasculature. 2. Mild bilateral proximal internal carotid artery stenosis. 3. No evidence of vertebral artery dissection or stenosis. Electronically Signed: Brigido Esther  1/1/2024 8:38 PM EST  Workstation ID: [id number]    CT Angiogram Neck    Result Date: 1/1/2024  Impression: 1. No evidence of large vessel occlusion within the intracranial vasculature. 2. Mild bilateral proximal internal carotid artery stenosis. 3. No evidence of vertebral artery dissection or stenosis. Electronically Signed: Brigido Santana  1/1/2024 8:38 PM EST  Workstation ID: [id number]    CT Head Without Contrast    Result Date: 1/1/2024  Impression: No acute intracranial abnormality. Electronically Signed: Tejas Anders MD  1/1/2024 7:25 PM EST  Workstation ID: [id number]       I reviewed the patient's new clinical results.    Assessment/Plan:     Active and Resolved Problems  Active Hospital Problems    Diagnosis  POA    **Vertigo [R42]  Yes    Diabetes mellitus type 2 in obese [E11.69, E66.9]  Yes    Smoker [F17.200]  Yes    Essential hypertension [I10]  Yes    Depression [F32.A]  Yes    Seizure disorder [G40.909]  Yes      Resolved Hospital Problems   No resolved problems to display.     Dizziness and vertigo  CT head negative, MRI of the brain completed, results pending.  Neurology following.  Check orthostatics  PT OT eval for BPPV  Echo shows EF of 55 to 60%  No diastolic dysfunction  Patient states that she was dizzy earlier even in bed when lying without moving and also when she got up.  However that has improved since yesterday and now she is only dizzy and experiences vertigo when she gets up.  She denies any upper respiratory tract symptoms.  Denies any inner ear problems.  Likely BPPV, PT OT has been consulted for BPPV evaluation and also for balance exercises.      Urinary tract  infection  On Bactrim  Continue, monitor urine cultures    Hyperlipidemia  Continue Crestor    Hypertension  On lisinopril hydrochlorothiazide    History of seizure disorder  On Depakote      DVT prophylaxis:  Mechanical DVT prophylaxis orders are present.     Code status is   Code Status and Medical Interventions:   Ordered at: 01/01/24 2248     Code Status (Patient has no pulse and is not breathing):    CPR (Attempt to Resuscitate)     Medical Interventions (Patient has pulse or is breathing):    Full Support       Plan for disposition: 1 to 2 days    Time: 30 minutes    Signature: Electronically signed by Lucy Sheets MD, 01/03/24, 12:41 EST.  [facility] Hospitalist Team

## 2024-01-04 VITALS
RESPIRATION RATE: 20 BRPM | WEIGHT: 225 LBS | HEART RATE: 71 BPM | OXYGEN SATURATION: 96 % | TEMPERATURE: 97.4 F | DIASTOLIC BLOOD PRESSURE: 64 MMHG | BODY MASS INDEX: 36.16 KG/M2 | SYSTOLIC BLOOD PRESSURE: 112 MMHG | HEIGHT: 66 IN

## 2024-01-04 LAB
ANION GAP SERPL CALCULATED.3IONS-SCNC: 12 MMOL/L (ref 5–15)
BASOPHILS # BLD AUTO: 0.1 10*3/MM3 (ref 0–0.2)
BASOPHILS NFR BLD AUTO: 0.9 % (ref 0–1.5)
BUN SERPL-MCNC: 15 MG/DL (ref 6–20)
BUN/CREAT SERPL: 21.7 (ref 7–25)
CALCIUM SPEC-SCNC: 9.6 MG/DL (ref 8.6–10.5)
CHLORIDE SERPL-SCNC: 100 MMOL/L (ref 98–107)
CO2 SERPL-SCNC: 24 MMOL/L (ref 22–29)
CREAT SERPL-MCNC: 0.69 MG/DL (ref 0.57–1)
DEPRECATED RDW RBC AUTO: 50.3 FL (ref 37–54)
EGFRCR SERPLBLD CKD-EPI 2021: 103.9 ML/MIN/1.73
EOSINOPHIL # BLD AUTO: 0.2 10*3/MM3 (ref 0–0.4)
EOSINOPHIL NFR BLD AUTO: 3 % (ref 0.3–6.2)
ERYTHROCYTE [DISTWIDTH] IN BLOOD BY AUTOMATED COUNT: 15 % (ref 12.3–15.4)
GLUCOSE SERPL-MCNC: 119 MG/DL (ref 65–99)
HCT VFR BLD AUTO: 42.2 % (ref 34–46.6)
HGB BLD-MCNC: 14.2 G/DL (ref 12–15.9)
LYMPHOCYTES # BLD AUTO: 3.2 10*3/MM3 (ref 0.7–3.1)
LYMPHOCYTES NFR BLD AUTO: 43.1 % (ref 19.6–45.3)
MCH RBC QN AUTO: 30.7 PG (ref 26.6–33)
MCHC RBC AUTO-ENTMCNC: 33.6 G/DL (ref 31.5–35.7)
MCV RBC AUTO: 91.4 FL (ref 79–97)
MONOCYTES # BLD AUTO: 0.7 10*3/MM3 (ref 0.1–0.9)
MONOCYTES NFR BLD AUTO: 9.8 % (ref 5–12)
NEUTROPHILS NFR BLD AUTO: 3.2 10*3/MM3 (ref 1.7–7)
NEUTROPHILS NFR BLD AUTO: 43.2 % (ref 42.7–76)
NRBC BLD AUTO-RTO: 0.1 /100 WBC (ref 0–0.2)
PLATELET # BLD AUTO: 196 10*3/MM3 (ref 140–450)
PMV BLD AUTO: 9.7 FL (ref 6–12)
POTASSIUM SERPL-SCNC: 4.3 MMOL/L (ref 3.5–5.2)
RBC # BLD AUTO: 4.62 10*6/MM3 (ref 3.77–5.28)
SODIUM SERPL-SCNC: 136 MMOL/L (ref 136–145)
WBC NRBC COR # BLD AUTO: 7.4 10*3/MM3 (ref 3.4–10.8)

## 2024-01-04 PROCEDURE — G0378 HOSPITAL OBSERVATION PER HR: HCPCS

## 2024-01-04 PROCEDURE — 85025 COMPLETE CBC W/AUTO DIFF WBC: CPT

## 2024-01-04 PROCEDURE — 80048 BASIC METABOLIC PNL TOTAL CA: CPT

## 2024-01-04 RX ORDER — CHOLECALCIFEROL (VITAMIN D3) 125 MCG
5 CAPSULE ORAL NIGHTLY PRN
Qty: 15 TABLET | Refills: 0 | Status: SHIPPED | OUTPATIENT
Start: 2024-01-04

## 2024-01-04 RX ORDER — LEVETIRACETAM 500 MG/1
500 TABLET ORAL 2 TIMES DAILY
Qty: 60 TABLET | Refills: 0 | Status: SHIPPED | OUTPATIENT
Start: 2024-01-04

## 2024-01-04 RX ORDER — MECLIZINE HYDROCHLORIDE 25 MG/1
25 TABLET ORAL 3 TIMES DAILY PRN
Qty: 90 TABLET | Refills: 0 | Status: SHIPPED | OUTPATIENT
Start: 2024-01-04

## 2024-01-04 RX ORDER — DIVALPROEX SODIUM 500 MG/1
500 TABLET, DELAYED RELEASE ORAL DAILY
Qty: 30 TABLET | Refills: 0 | Status: SHIPPED | OUTPATIENT
Start: 2024-01-05

## 2024-01-04 RX ADMIN — HYDROCHLOROTHIAZIDE 12.5 MG: 12.5 TABLET ORAL at 08:38

## 2024-01-04 RX ADMIN — ROSUVASTATIN 40 MG: 10 TABLET, FILM COATED ORAL at 08:38

## 2024-01-04 RX ADMIN — LISINOPRIL 20 MG: 20 TABLET ORAL at 08:39

## 2024-01-04 RX ADMIN — Medication 10 ML: at 08:43

## 2024-01-04 RX ADMIN — SERTRALINE 100 MG: 100 TABLET, FILM COATED ORAL at 08:39

## 2024-01-04 RX ADMIN — POLYETHYLENE GLYCOL 3350 17 G: 17 POWDER, FOR SOLUTION ORAL at 08:39

## 2024-01-04 RX ADMIN — DIVALPROEX SODIUM 500 MG: 500 TABLET, DELAYED RELEASE ORAL at 08:38

## 2024-01-04 RX ADMIN — LEVETIRACETAM 500 MG: 500 TABLET, FILM COATED ORAL at 08:38

## 2024-01-04 RX ADMIN — PANTOPRAZOLE SODIUM 40 MG: 40 TABLET, DELAYED RELEASE ORAL at 08:39

## 2024-01-04 RX ADMIN — SULFAMETHOXAZOLE AND TRIMETHOPRIM 1 TABLET: 800; 160 TABLET ORAL at 08:39

## 2024-01-04 NOTE — DISCHARGE PLACEMENT REQUEST
"  OP PT referral. Please call patient to schedule. Will d/c 1/4/23.    Thanks!    LINNE[place], [place] [address]    Office: [phone]  Fax: [phone]      Neno Huertas (53 y.o. Female)       Date of Birth   1970    Social Security Number       Address   [address] [place] [address] [place] [facility]    Home Phone   [phone]    MRN   [id number]       Zoroastrianism   None    Marital Status                               Admission Date   1/1/24    Admission Type   Urgent    Admitting Provider   Luis Cedillo DO    Attending Provider   Lucy Sheets MD    Department, Room/Bed   [facility] 3C MEDICAL INPATIENT, 382/1       Discharge Date       [place] [facility] [place] Destination                                 Attending Provider: Lucy Sheets MD    Allergies: Amoxicillin-pot Clavulanate    Isolation: None   Infection: None   Code Status: CPR    Ht: 167.6 cm (66\")   Wt: 102 kg (225 lb)    Admission Cmt: None   Principal Problem: Vertigo [R42]                   Active Insurance as of 1/1/2024       Primary Coverage       Payor Plan Insurance Group Employer/Plan Group    [facility] BLUE CROSS [id number]       Payor Plan Address Payor Plan Phone Number Payor Plan Fax Number Effective Dates    [address]   1/10/2016 - None Entered    [place] [address]         Subscriber Name Subscriber Birth Date Member ID       NENO HUERTAS 1970 [id number]                     Emergency Contacts        (Rel.) Home Phone Work Phone Mobile Phone    Angie Mistryine (Sister) [phone] -- --    PietroFelice (Significant Other) [phone] -- --            Ambulatory Referral to Physical Therapy [REF87] (Order [id number])  Order  Date: 1/4/2024 Department: [facility] 3C MEDICAL INPATIENT Ordering/Authorizing: Lucy Sheets MD     Order History  Outpatient  Date/Time " Action Taken User Additional Information   24 1206 Sign Lucy Sheets MD      Order Details    Frequency Duration Priority Order Class   None None Routine Internal Referral     Start Date/Time    Start Date   24     Order Information    Order Date Service Start Date Start Time   24 Medicine 24      Reference Links    Associated Reports   View Encounter     Order Questions    Question Answer   Follow-up needed: Yes            Source Order Set / Preference List    Order Set    IP GEN EXPRESS DISCHARGE [[id number]]           Clinical Indications     ICD-10-CM ICD-9-CM   Vertigo  - Primary    R42 780.4   Seizure disorder    G40.909 345.90                             Reprint Order Requisition    Ambulatory Referral to Physical Therapy (Order #[id number]) on 24         Encounter    View Encounter                Order Provider Info        Office phone Pager E-mail   Ordering User  Lucy Sheets MD  [phone] -- --   Authorizing Provider  Lucy Sehets MD  [phone] -- --   Attending Provider  Lucy Sheets MD  [phone] -- --     Tracking Reports    Cosign Tracking Order Transmittal Tracking     Authorized by:  Lucy Sheets MD  (NPI: [id number])                Lab Component SmartPhrase Guide    Ambulatory Referral to Physical Therapy (Order #[id number]) on 24              Physical Therapy Notes (most recent note)        Surekha Benedict, PT at 24 1453  Version 1 of 1         Patient Name: Vero Cadena  : 1970    MRN: [id number]                              Today's Date: 1/3/2024       Admit Date: 2024    Visit Dx:     ICD-10-CM ICD-9-CM   1. Vertigo  R42 780.4     Patient Active Problem List   Diagnosis    Urethral sphincter deficiency, intrinsic (ISD)    Vertigo    Depression    Diabetes mellitus type 2 in obese    Essential hypertension    Smoker    Seizure disorder     Past Medical History:   Diagnosis Date    Arthritis     lower back    Asthma     Depression      Diabetes mellitus     Epilepsy     High cholesterol     History of pneumonia     Hypertension     Recurrent boils     venessa inner thighs pt applies clindamycin topical and is taking doxycycline     Sleep apnea     no machine    Urinary incontinence      Past Surgical History:   Procedure Laterality Date    CYSTOSCOPY W/ BULKING AGENT INJECTION N/A 3/11/2016    Procedure: CYSTOSCOPY WITH COAPTITE INJECTION;  Surgeon: Luis Dumont MD;  Location[facility] OR;  Service:     LAPAROSCOPIC TUBAL LIGATION Bilateral     TRANSVAGINAL TAPING SUSPENSION        General Information       Row Name 01/03/24 1338          Physical Therapy Time and Intention    Document Type evaluation  -AO     Mode of Treatment physical therapy  -AO       Row Name 01/03/24 1338          General Information    Patient Profile Reviewed yes  -AO     Prior Level of Function --  Previously independent w/ all mobility/ADLs w/ no AD, still drives, denies falls  -AO     Existing Precautions/Restrictions no known precautions/restrictions  -AO     Barriers to Rehab none identified  -AO       Row Name 01/03/24 1338          Living Environment    People in Home other (see comments)  Boyfriend  -AO       Row Name 01/03/24 1338          Cognition    Orientation Status (Cognition) oriented x 4  -AO       Row Name 01/03/24 1338          Safety Issues, Functional Mobility    Impairments Affecting Function (Mobility) balance;visual/perceptual  -AO               User Key  (r) = Recorded By, (t) = Taken By, (c) = Cosigned By      Initials Name Provider Type    AO Surekha Benedict, PT Physical Therapist                   Mobility       Row Name 01/03/24 1338          Bed Mobility    Bed Mobility bed mobility (all) activities  -AO     All Activities, Alexander (Bed Mobility) standby assist  -AO       Row Name 01/03/24 1339          Sit-Stand Transfer    Sit-Stand Alexander (Transfers) standby assist  -AO       Row Name 01/03/24 1333           "Gait/Stairs (Locomotion)    [place] Level (Gait) standby assist  -AO     Distance in Feet (Gait) 50 ft x 4  -AO     Comment, (Gait/Stairs) Lateral veering/\"furtniture cruising\" during first bout that improved with each bout of Canolith repositioning manuver  -AO               User Key  (r) = Recorded By, (t) = Taken By, (c) = Cosigned By      Initials Name Provider Type    Surekha Hill, PT Physical Therapist                   Obj/Interventions       Row Name 01/03/24 1338          Range of Motion Comprehensive    General Range of Motion no range of motion deficits identified  -AO       Row Name 01/03/24 1338          Strength Comprehensive (MMT)    General Manual Muscle Testing (MMT) Assessment no strength deficits identified  -AO       Row Name 01/03/24 1338          Balance    Balance Assessment sitting static balance;sitting dynamic balance;sit to stand dynamic balance;standing static balance;standing dynamic balance  -AO     Static Sitting Balance independent  -AO     Dynamic Sitting Balance independent  -AO     Position, Sitting Balance unsupported;sitting edge of bed  -AO     Sit to Stand Dynamic Balance standby assist  -AO     Static Standing Balance standby assist  -AO     Dynamic Standing Balance contact guard  -AO     Position/Device Used, Standing Balance unsupported  -AO     Comment, Balance Gino-Hallpike Test completed revealing right, upbeating, torsional nystagmus(+) R posterior canal BPPV, PT completed x 3 canolith repositioning manuvers with improvement in symptoms and resolution of nystagmus  -AO       Row Name 01/03/24 1338          Sensory Assessment (Somatosensory)    Sensory Assessment (Somatosensory) sensation intact  -AO               User Key  (r) = Recorded By, (t) = Taken By, (c) = Cosigned By      Initials Name Provider Type    Surekha Hill PT Physical Therapist                   Goals/Plan       Row Name 01/03/24 1338          Bed Mobility Goal 1 (PT)    " Activity/Assistive Device (Bed Mobility Goal 1, PT) bed mobility activities, all  -AO     [place] Level/Cues Needed (Bed Mobility Goal 1, PT) independent  -AO     Time Frame (Bed Mobility Goal 1, PT) long term goal (LTG);2 weeks  -AO     Progress/Outcomes (Bed Mobility Goal 1, PT) goal not met  -AO       Row Name 01/03/24 1338          Transfer Goal 1 (PT)    Activity/Assistive Device (Transfer Goal 1, PT) transfers, all  -AO     [place] Level/Cues Needed (Transfer Goal 1, PT) independent  -AO     Time Frame (Transfer Goal 1, PT) long term goal (LTG);2 weeks  -AO     Progress/Outcome (Transfer Goal 1, PT) goal not met  -AO       Row Name 01/03/24 1336          Gait Training Goal 1 (PT)    Activity/Assistive Device (Gait Training Goal 1, PT) gait (walking locomotion)  -AO     [place] Level (Gait Training Goal 1, PT) independent  -AO     Distance (Gait Training Goal 1, PT) 400 ft  -AO     Time Frame (Gait Training Goal 1, PT) long term goal (LTG);2 weeks  -AO     Progress/Outcome (Gait Training Goal 1, PT) goal not met  -AO       Row Name 01/03/24 9744          Therapy Assessment/Plan (PT)    Planned Therapy Interventions (PT) balance training;bed mobility training;gait training;home exercise program;neuromuscular re-education;patient/family education;strengthening;vestibular therapy;transfer training  -AO               User Key  (r) = Recorded By, (t) = Taken By, (c) = Cosigned By      Initials Name Provider Type    AO Surekha Benedict, PT Physical Therapist                   Clinical Impression       Row Name 01/03/24 4969          Pain    Pretreatment Pain Rating 0/10 - no pain  -AO     Posttreatment Pain Rating 0/10 - no pain  -AO       Row Name 01/03/24 1334          Plan of Care Review    Plan of Care Reviewed With patient;sibling  -AO     Progress no change  -AO     Outcome Evaluation Pt is a 54 y/o F who presented to [facility] w/ c/o dizziness w/ positional changes accompanied by nausea/vomitting,  "reports of muscle cramping, and intermittent confusion. Pt reports she recently had a UTI and w/ hx of epilepsy (has not had a seizure since 2003), DM type II, asthma, tobacco abuse, HTN, and HLD. CT head/MRI brain (-) for acute findings, CTA head/neck w/ mild bilateral paroxysmal ICA stenosis. At baseline, pt lives w/ her boyfriend and was independent w/ all mobility/ADLs w/ no AD, denies falls. Pt reports she woke up Sunday, December 31, 2023 w/ mild confusion and dizziness that has not resolved and feels like room-spinning dizziness. [place]-Hallpike Test completed revealing right, upbeating, torsional nystagmus (+) R posterior canal BPPV. PT completed x 3 canolith repositioning manuvers with improvement in symptoms and resolution of nystagmus. Pt required SBA for bed mobility/transfers, initially required CGA for gait training with improvement s/p repositioning manuvers to require only SBA (50 ft x 3 w/ no AD). Pt reports feeling \"so much better but still a little fuzzy\". Pt reports she was unable to \"walk in a straight line\" prior to session and able to ambulate with minimal lateral veering or unsteadiness noted towards end of bout. Anticipate pt will be safe to d/c home w/ significant other and follow-up with OP PT for canolith repositioning/vestibular rehab as needed. Plan to follow up for 1-2 additional sessions to monitor/treat as needed. PPE: gloves, mask  -AO       Row Name 01/03/24 7000          Therapy Assessment/Plan (PT)    Rehab Potential (PT) good, to achieve stated therapy goals  -AO     Criteria for Skilled Interventions Met (PT) yes;meets criteria;skilled treatment is necessary  -AO     Therapy Frequency (PT) 3 times/wk  -AO     Predicted Duration of Therapy Intervention (PT) until d/c  -AO       Row Name 01/03/24 6602          Vital Signs    Recovery Time vitals stable and WNL on RA throughout session  -AO       Row Name 01/03/24 4924          Positioning and Restraints    Pre-Treatment Position in " bed  -AO     Post Treatment Position bed  -AO     In Bed sitting EOB;call light within reach;encouraged to call for assist  -AO               User Key  (r) = Recorded By, (t) = Taken By, (c) = Cosigned By      Initials Name Provider Type    AO Surekha Benedict, PT Physical Therapist                   Outcome Measures    No documentation.                                Physical Therapy Education       Title: PT OT SLP Therapies (Done)       Topic: Physical Therapy (Done)       Point: Mobility training (Done)       Learning Progress Summary             Patient Acceptance, E,TB, VU by AO at 1/3/2024 1450                                         User Key       Initials Effective Dates Name Provider Type Discipline    AO 06/16/21 -  Surekha Benedict, PT Physical Therapist PT                  PT Recommendation and Plan  Planned Therapy Interventions (PT): balance training, bed mobility training, gait training, home exercise program, neuromuscular re-education, patient/family education, strengthening, vestibular therapy, transfer training  Plan of Care Reviewed With: patient, sibling  Progress: no change  Outcome Evaluation: Pt is a 52 y/o F who [facility] to [facility] w/ c/o dizziness w/ positional changes accompanied by nausea/vomitting, reports of muscle cramping, and intermittent confusion. Pt reports she recently had a UTI and w/ hx of epilepsy (has not had a seizure since 2003), DM type II, asthma, tobacco abuse, HTN, and HLD. CT head/MRI brain (-) for acute findings, CTA head/neck w/ mild bilateral paroxysmal ICA stenosis. At baseline, pt lives w/ her boyfriend and was independent w/ all mobility/ADLs w/ no AD, denies falls. Pt reports she woke up Sunday, December 31, 2023 w/ mild confusion and dizziness that has not resolved and feels like room-spinning dizziness. Gino-Hallpike Test completed revealing right, upbeating, torsional nystagmus (+) R posterior canal BPPV. PT completed x 3 canolith repositioning manuvers with  "improvement in symptoms and resolution of nystagmus. Pt required SBA for bed mobility/transfers, initially required CGA for gait training with improvement s/p repositioning manuvers to require only SBA (50 ft x 3 w/ no AD). Pt reports feeling \"so much better but still a little fuzzy\". Pt reports she was unable to \"walk in a straight line\" prior to session and able to ambulate with minimal lateral veering or unsteadiness noted towards end of bout. Anticipate pt will be safe to d/c home w/ significant other and follow-up with OP PT for canolith repositioning/vestibular rehab as needed. Plan to follow up for 1-2 additional sessions to monitor/treat as needed. PPE: gloves, mask     Time Calculation:   PT Evaluation Complexity  History, PT Evaluation Complexity: 3 or more personal factors and/or comorbidities  Examination of Body Systems (PT Eval Complexity): total of 3 or more elements  Clinical Presentation (PT Evaluation Complexity): evolving  Clinical Decision Making (PT Evaluation Complexity): moderate complexity  Overall Complexity (PT Evaluation Complexity): moderate complexity     PT Charges       Row Name 01/03/24 1451             Time Calculation    Start Time 1338  -AO      Stop Time 1425  -AO      Time Calculation (min) 47 min  -AO      PT Received On 01/03/24  -AO      PT - Next Appointment 01/04/24  -AO      PT Goal Re-Cert Due Date 01/17/24  -AO         Time Calculation- PT    Total Timed Code Minutes- PT 25 minute(s)  -AO                User Key  (r) = Recorded By, (t) = Taken By, (c) = Cosigned By      Initials Name Provider Type    AO Surekha Benedict, PT Physical Therapist                  Therapy Charges for Today       Code Description Service Date Service Provider Modifiers Qty    17627113381 HC PT EVAL MOD COMPLEXITY 4 1/3/2024 Surekha Benedict, PT GP 1    04837104851 HC GAIT TRAINING EA 15 MIN 1/3/2024 Surekha Benedict, PT GP 1    06815380017 HC PT CANALITH REPOSITIONING PER DAY 1/3/2024 [place], " Surekha HOWARD, PT GP 1            PT G-Codes  AM-PAC 6 Clicks Score (PT): 24  PT Discharge Summary  Anticipated Discharge Disposition (PT): home with outpatient therapy services    Surekha Benedict, PT  1/3/2024      Electronically signed by Surekha Benedict, PT at 24 1453          Discharge Summary        Lucy Sheets MD at 24 [facility]                       [facility] Medicine Services  Discharge Summary    Date of Service: 2023  Patient Name: Vero Cadena  : 1970  MRN: [id number]    Date of Admission: 2024  Discharge Diagnosis: vertigo, improved   Date of Discharge:  2023  Primary Care Physician: Vito Schreiber MD      Presenting Problem:   Vertigo [R42]    Active and Resolved Hospital Problems:  Active Hospital Problems    Diagnosis POA    **Vertigo [R42] Yes    Diabetes mellitus type 2 in obese [E11.69, E66.9] Yes    Smoker [F17.200] Yes    Essential hypertension [I10] Yes    Depression [F32.A] Yes    Seizure disorder [G40.909] Yes      Resolved Hospital Problems   No resolved problems to display.         Hospital Course         Hospital Course:    Patient is a very pleasant 53-year-old female who presented to the hospital with complaints of dizziness and vertigo.  CT of the head was done that was negative.  Neurology was consulted.  MRI of the brain was done that did not show any acute or subacute stroke either.  Patient does have history of seizure disorder and takes Depakote at home.  Patient was found to be confused in the hospital, neurology recommended decreasing the dose of Depakote to 500 mg.  2000 mg daily and added Keppra.  Scripts have been provided and Depakote dosage has been changed.  Patient's ammonia level was also slightly elevated however rechecked and was okay.  Neurology recommends following up with balance center and thinks most likely secondary to BPPV.  PT was consulted.  Patient states that exercises with physical therapy helped quite a bit.  Patient will  need to follow-up with the dizziness center.  Meclizine as needed has been added    Also noted that patient's blood pressure was on the softer side with her home blood pressure medications, at this time I have discontinued those and will need to further follow-up with PCP for resumption of any blood pressure medications.  Softer blood pressure could also be contributing to her dizziness.  Echocardiogram was done and showed EF of 55 to 60%, no diastolic dysfunction.    Patient expresses understanding and is currently medically stable for discharge and will need to follow-up with her own neurologist.  She understands that her seizure medications have been changed    She also had a UTI and continue taking her Bactrim that she was on before.      Rest of her home medications have been continued u               Day of Discharge     Vital Signs:  Temp:  [98.1 °F (36.7 °C)-98.9 °F (37.2 °C)] 98.9 °F (37.2 °C)  Heart Rate:  [71-83] 71  Resp:  [17-18] 18  BP: ()/(52-69) 127/69    Physical Exam:  Physical Exam   Gen: NAD.  Obese  HEENT: nc/at/naresh   Chest : cta b/l  CVS : s1, s2 normal, rrr  Abdomen: soft, NT, ND bs positive  Extremities: 2 + pulses, no oedema  Neuro: AAO*3, grossly normal  Psych, calm and cooperative    Pertinent  and/or Most Recent Results     LAB RESULTS:      Lab 01/04/24 0101 01/03/24  1244 01/02/24  0309 01/01/24  1724   WBC 7.40 10.10 8.10 6.81   HEMOGLOBIN 14.2 15.1 14.3 12.1   HEMATOCRIT 42.2 45.6 43.3 37.3   PLATELETS 196 257 231 185   NEUTROS ABS 3.20 5.50 4.00 3.82   IMMATURE GRANS (ABS)  --   --   --  0.01   LYMPHS ABS 3.20* 3.40* 3.40* 2.39   MONOS ABS 0.70 0.90 0.60 0.48   EOS ABS 0.20 0.20 0.20 0.09   MCV 91.4 93.7 93.1 94.0   SED RATE  --   --  9  --    CRP  --   --  <0.30  --          Lab 01/04/24  0101 01/03/24  1244 01/02/24  0309 01/01/24  1724   SODIUM 136 136 132* 133*   POTASSIUM 4.3 4.5 3.6 4.2   CHLORIDE 100 98 96* 100   CO2 24.0 22.0 26.0 24.7   ANION GAP 12.0 16.0* 10.0 8.3    BUN 15 17 19 19   CREATININE 0.69 0.85 0.90 0.88   EGFR 103.9 82.0 76.6 78.7   GLUCOSE 119* 114* 110* 113*   CALCIUM 9.6 10.1 9.8 9.7   MAGNESIUM  --  2.0 2.3  --    PHOSPHORUS  --  2.6 4.3  --    HEMOGLOBIN A1C  --   --  6.00*  --    TSH  --   --  3.060  --          Lab 01/03/24  1244   TOTAL PROTEIN 8.0   ALBUMIN 4.7   GLOBULIN 3.3   ALT (SGPT) 11   AST (SGOT) 17   BILIRUBIN 0.3   ALK PHOS 70         Lab 01/01/24  1935 01/01/24  1724   HSTROP T <6 <6         Lab 01/02/24  0309   CHOLESTEROL 138   LDL CHOL 77   HDL CHOL 36*   TRIGLYCERIDES 141         Lab 01/02/24  1902   VITAMIN B 12 650         Brief Urine Lab Results  (Last result in the past 365 days)        Color   Clarity   Blood   Leuk Est   Nitrite   Protein   CREAT   Urine HCG        12/26/23 1611 Carla     50 Brian/uL   500 Bruce/ul   Negative                   Microbiology Results (last 10 days)       ** No results found for the last 240 hours. **            MRI Brain Without Contrast    Result Date: 1/3/2024  Impression: Impression: 1. Limited exam due to motion artifact on multiple pulse sequences. 2. No definite acute findings. Electronically Signed: Vishal Rosas MD  1/3/2024 1:34 PM EST  Workstation ID: [id number]    CT Angiogram Head    Result Date: 1/1/2024  Impression: Impression: 1. No evidence of large vessel occlusion within the intracranial vasculature. 2. Mild bilateral proximal internal carotid artery stenosis. 3. No evidence of vertebral artery dissection or stenosis. Electronically Signed: Brigido Santana  1/1/2024 8:38 PM EST  Workstation ID: [id number]    CT Angiogram Neck    Result Date: 1/1/2024  Impression: Impression: 1. No evidence of large vessel occlusion within the intracranial vasculature. 2. Mild bilateral proximal internal carotid artery stenosis. 3. No evidence of vertebral artery dissection or stenosis. Electronically Signed: Brigido Santana  1/1/2024 8:38 PM EST  Workstation ID: [id number]    CT Head Without Contrast    Result Date:  1/1/2024  Impression: Impression: No acute intracranial abnormality. Electronically Signed: Tejas Anders MD  1/1/2024 7:25 PM EST  Workstation ID: [id number]             Results for orders placed during the hospital encounter of 01/01/24    Adult Transthoracic Echo Complete W/ Cont if Necessary Per Protocol    Interpretation Summary    Left ventricular systolic function is normal. Left ventricular ejection fraction appears to be 56 - 60%.    Left ventricular diastolic function was normal.      Labs Pending at Discharge:      Procedures Performed           Consults:   Consults       Date and Time Order Name Status Description    1/2/2024 10:14 AM Inpatient Neurology Consult General                Discharge Details        Discharge Medications        New Medications        Instructions Start Date   levETIRAcetam 500 MG tablet  Commonly known as: KEPPRA   500 mg, Oral, 2 Times Daily      meclizine 25 MG tablet  Commonly known as: ANTIVERT   25 mg, Oral, 3 Times Daily PRN      melatonin 5 MG tablet tablet   5 mg, Oral, Nightly PRN             Changes to Medications        Instructions Start Date   divalproex 500 MG DR tablet  Commonly known as: DEPAKOTE  What changed:   how much to take  when to take this   500 mg, Oral, Daily   Start Date: January 5, 2024            Continue These Medications        Instructions Start Date   acetaminophen 500 MG tablet  Commonly known as: TYLENOL   500 mg, Oral, Every 4 Hours PRN      albuterol sulfate  (90 Base) MCG/ACT inhaler  Commonly known as: PROVENTIL HFA;VENTOLIN HFA;PROAIR HFA   2 puffs, Inhalation, Every 4 Hours PRN      ARIPiprazole 2 MG tablet  Commonly known as: ABILIFY   2 mg, Oral, Every Morning      atorvastatin 20 MG tablet  Commonly known as: LIPITOR   20 mg, Oral, Every Morning      azithromycin 250 MG tablet  Commonly known as: Zithromax Z-Wyatt   Take 2 tablets by mouth on day 1, then 1 tablet daily on days 2-5      clindamycin 1 % swab  Commonly known as:  CLEOCIN T   1 application , Topical, 2 Times Daily      escitalopram 10 MG tablet  Commonly known as: LEXAPRO   10 mg, Oral, Daily      ezetimibe 10 MG tablet  Commonly known as: ZETIA   10 mg, Oral, Every Morning      Fluticasone Furoate-Vilanterol 200-25 MCG/INH inhaler  Commonly known as: BREO ELLIPTA   1 puff, Inhalation, Nightly      HYDROcodone-acetaminophen 5-325 MG per tablet  Commonly known as: Norco   1 tablet, Oral, Every 6 Hours PRN      metFORMIN 500 MG tablet  Commonly known as: GLUCOPHAGE   500 mg, Oral, Daily With Breakfast      methocarbamol 500 MG tablet  Commonly known as: ROBAXIN   500 mg, Oral, 4 Times Daily PRN      methylphenidate 27 MG CR tablet   27 mg, Oral, 4 am        methylphenidate 20 MG tablet  Commonly known as: RITALIN   20 mg, Oral, 2 pm       Mirabegron ER 50 MG tablet sustained-release 24 hour 24 hr tablet  Commonly known as: MYRBETRIQ   50 mg, Oral, Every Morning      montelukast 10 MG tablet  Commonly known as: SINGULAIR   10 mg, Oral, Nightly      pantoprazole 40 MG EC tablet  Commonly known as: PROTONIX   40 mg, Oral, Every Morning      sertraline 100 MG tablet  Commonly known as: ZOLOFT   100 mg, Oral, Every Morning      sulfamethoxazole-trimethoprim 800-160 MG per tablet  Commonly known as: BACTRIM DS,SEPTRA DS   1 tablet, Oral, 2 Times Daily             Stop These Medications      doxycycline 100 MG DR capsule  Commonly known as: DORYX     lisinopril-hydrochlorothiazide 20-12.5 MG per tablet  Commonly known as: PRINZIDE,ZESTORETIC     meloxicam 7.5 MG tablet  Commonly known as: MOBIC     rosuvastatin 20 MG tablet  Commonly known as: CRESTOR              Allergies   Allergen Reactions    Amoxicillin-Pot Clavulanate Other (See Comments)     Bad yeast infection         Discharge Disposition:   Home-Health Care Svc    Diet:  Hospital:  Diet Order   Procedures    Diet: Cardiac Diets; Healthy Heart (2-3 Na+); Texture: Regular Texture (IDDSI 7); Fluid Consistency: Thin (IDDSI 0)          Discharge Activity:   as tolerated       CODE STATUS:  Code Status and Medical Interventions:   Ordered at: 01/01/24 5879     Code Status (Patient has no pulse and is not breathing):    CPR (Attempt to Resuscitate)     Medical Interventions (Patient has pulse or is breathing):    Full Support         No future appointments.    Additional Instructions for the Follow-ups that You Need to Schedule       Discharge Follow-up with PCP   As directed       Currently Documented PCP:    Vito Schreiber MD    PCP Phone Number:    [phone]     Follow Up Details: 1 week        Discharge Follow-up with Specified Provider: neurology; 1 Week   As directed      To: neurology   Follow Up: 1 Week                Time spent on Discharge including face to face service:  >30 minutes    Signature: Electronically signed by Lucy Sheets MD, 01/04/24, 11:56 EST.  [facility] Hospitalist Team    Electronically signed by Lucy Sheets MD at 01/04/24 3474

## 2024-01-04 NOTE — CASE MANAGEMENT/SOCIAL WORK
Continued Stay Note  [facility]     Patient Name: Vero Cadena  MRN: [id number]  Today's Date: 1/4/2024    Admit Date: 1/1/2024    Plan: Anticipate routine home. OP PT referral [facility] (order fax, clinic to call patient to schedule).   Discharge Plan       Row Name 01/04/24 1217       Plan    Plan Anticipate routine home. OP PT referral [facility] (order fax, clinic to call patient to schedule).    Plan Comments  faxed OP PT order to [facility] in [facility]. D/C orders noted.    Final Discharge Disposition Code 01 - home or self-care    Final Note Home with OP PT [facility]                      Expected Discharge Date and Time       Expected Discharge Date Expected Discharge Time    Jan 4, 2024              Case Management Discharge Note      Final Note: Home with OP PT [facility]    Provided Post Acute Provider List?: N/A    Selected Continued Care - Admitted Since 1/1/2024           Therapy Coordination complete.      Service Provider Selected Services Address Phone Fax Patient Preferred    [facility] - [facility] [address] [place] [address] [place] [address] [phone]              Transportation Services  Private: Car    Final Discharge Disposition Code: 01 - home or self-care      Phone communication or documentation only - no physical contact with patient or family.     LINNEA Shetty, RN    [address] [address]    Office: [phone]  Fax: [phone]

## 2024-01-04 NOTE — PLAN OF CARE
Patient states she feels much better today. Her thoughts are more clear and she is not having word-finding difficulty. She still has a little bit of dizziness when walking but much better today.     NH3 level down. We recommend to continue the Depakote 500 mg DR GATICA and Keppra 500 mg BID.     Patient can be d/c home. She is in contact with her Neurologist and is planning a follow up visit for further instruction.     There is nothing else to add at this time. Discussed resting the next few days and avoid driving until her dizziness resolves.     Please call with any questions or concerns.  Thank you for this consult.

## 2024-01-04 NOTE — DISCHARGE SUMMARY
[facility] Medicine Services  Discharge Summary    Date of Service: 2023  Patient Name: Vero Cadena  : 1970  MRN: [id number]    Date of Admission: 2024  Discharge Diagnosis: vertigo, improved   Date of Discharge:  2023  Primary Care Physician: Vito Schreiber MD      Presenting Problem:   Vertigo [R42]    Active and Resolved Hospital Problems:  Active Hospital Problems    Diagnosis POA    **Vertigo [R42] Yes    Diabetes mellitus type 2 in obese [E11.69, E66.9] Yes    Smoker [F17.200] Yes    Essential hypertension [I10] Yes    Depression [F32.A] Yes    Seizure disorder [G40.909] Yes      Resolved Hospital Problems   No resolved problems to display.         Hospital Course         Hospital Course:    Patient is a very pleasant 53-year-old female who presented to the hospital with complaints of dizziness and vertigo.  CT of the head was done that was negative.  Neurology was consulted.  MRI of the brain was done that did not show any acute or subacute stroke either.  Patient does have history of seizure disorder and takes Depakote at home.  Patient was found to be confused in the hospital, neurology recommended decreasing the dose of Depakote to 500 mg.  2000 mg daily and added Keppra.  Scripts have been provided and Depakote dosage has been changed.  Patient's ammonia level was also slightly elevated however rechecked and was okay.  Neurology recommends following up with balance center and thinks most likely secondary to BPPV.  PT was consulted.  Patient states that exercises with physical therapy helped quite a bit.  Patient will need to follow-up with the dizziness center.  Meclizine as needed has been added    Also noted that patient's blood pressure was on the softer side with her home blood pressure medications, at this time I have discontinued those and will need to further follow-up with PCP for resumption of any blood pressure medications.  Softer blood pressure could  also be contributing to her dizziness.  Echocardiogram was done and showed EF of 55 to 60%, no diastolic dysfunction.    Patient expresses understanding and is currently medically stable for discharge and will need to follow-up with her own neurologist.  She understands that her seizure medications have been changed    She also had a UTI and continue taking her Bactrim that she was on before.      Rest of her home medications have been continued u               Day of Discharge     Vital Signs:  Temp:  [98.1 °F (36.7 °C)-98.9 °F (37.2 °C)] 98.9 °F (37.2 °C)  Heart Rate:  [71-83] 71  Resp:  [17-18] 18  BP: ()/(52-69) 127/69    Physical Exam:  Physical Exam   Gen: NAD.  Obese  HEENT: nc/at/naresh   Chest : cta b/l  CVS : s1, s2 normal, rrr  Abdomen: soft, NT, ND bs positive  Extremities: 2 + pulses, no oedema  Neuro: AAO*3, grossly normal  Psych, calm and cooperative    Pertinent  and/or Most Recent Results     LAB RESULTS:      Lab 01/04/24 0101 01/03/24 1244 01/02/24  0309 01/01/24  1724   WBC 7.40 10.10 8.10 6.81   HEMOGLOBIN 14.2 15.1 14.3 12.1   HEMATOCRIT 42.2 45.6 43.3 37.3   PLATELETS 196 257 231 185   NEUTROS ABS 3.20 5.50 4.00 3.82   IMMATURE GRANS (ABS)  --   --   --  0.01   LYMPHS ABS 3.20* 3.40* 3.40* 2.39   MONOS ABS 0.70 0.90 0.60 0.48   EOS ABS 0.20 0.20 0.20 0.09   MCV 91.4 93.7 93.1 94.0   SED RATE  --   --  9  --    CRP  --   --  <0.30  --          Lab 01/04/24 0101 01/03/24  1244 01/02/24  0309 01/01/24  1724   SODIUM 136 136 132* 133*   POTASSIUM 4.3 4.5 3.6 4.2   CHLORIDE 100 98 96* 100   CO2 24.0 22.0 26.0 24.7   ANION GAP 12.0 16.0* 10.0 8.3   BUN 15 17 19 19   CREATININE 0.69 0.85 0.90 0.88   EGFR 103.9 82.0 76.6 78.7   GLUCOSE 119* 114* 110* 113*   CALCIUM 9.6 10.1 9.8 9.7   MAGNESIUM  --  2.0 2.3  --    PHOSPHORUS  --  2.6 4.3  --    HEMOGLOBIN A1C  --   --  6.00*  --    TSH  --   --  3.060  --          Lab 01/03/24  1244   TOTAL PROTEIN 8.0   ALBUMIN 4.7   GLOBULIN 3.3   ALT (SGPT)  11   AST (SGOT) 17   BILIRUBIN 0.3   ALK PHOS 70         Lab 01/01/24  1935 01/01/24  1724   HSTROP T <6 <6         Lab 01/02/24  0309   CHOLESTEROL 138   LDL CHOL 77   HDL CHOL 36*   TRIGLYCERIDES 141         Lab 01/02/24  1902   VITAMIN B 12 650         Brief Urine Lab Results  (Last result in the past 365 days)        Color   Clarity   Blood   Leuk Est   Nitrite   Protein   CREAT   Urine HCG        12/26/23 1611 Carla     50 Brian/uL   500 Bruce/ul   Negative                   Microbiology Results (last 10 days)       ** No results found for the last 240 hours. **            MRI Brain Without Contrast    Result Date: 1/3/2024  Impression: Impression: 1. Limited exam due to motion artifact on multiple pulse sequences. 2. No definite acute findings. Electronically Signed: Vishal Rosas MD  1/3/2024 1:34 PM EST  Workstation ID: [id number]    CT Angiogram Head    Result Date: 1/1/2024  Impression: Impression: 1. No evidence of large vessel occlusion within the intracranial vasculature. 2. Mild bilateral proximal internal carotid artery stenosis. 3. No evidence of vertebral artery dissection or stenosis. Electronically Signed: Brigido Santana  1/1/2024 8:38 PM EST  Workstation ID: [id number]    CT Angiogram Neck    Result Date: 1/1/2024  Impression: Impression: 1. No evidence of large vessel occlusion within the intracranial vasculature. 2. Mild bilateral proximal internal carotid artery stenosis. 3. No evidence of vertebral artery dissection or stenosis. Electronically Signed: Brigido Santana  1/1/2024 8:38 PM EST  Workstation ID: [id number]    CT Head Without Contrast    Result Date: 1/1/2024  Impression: Impression: No acute intracranial abnormality. Electronically Signed: Tejas Anders MD  1/1/2024 7:25 PM EST  Workstation ID: [id number]             Results for orders placed during the hospital encounter of 01/01/24    Adult Transthoracic Echo Complete W/ Cont if Necessary Per Protocol    Interpretation Summary    Left  ventricular systolic function is normal. Left ventricular ejection fraction appears to be 56 - 60%.    Left ventricular diastolic function was normal.      Labs Pending at Discharge:      Procedures Performed           Consults:   Consults       Date and Time Order Name Status Description    1/2/2024 10:14 AM Inpatient Neurology Consult General                Discharge Details        Discharge Medications        New Medications        Instructions Start Date   levETIRAcetam 500 MG tablet  Commonly known as: KEPPRA   500 mg, Oral, 2 Times Daily      meclizine 25 MG tablet  Commonly known as: ANTIVERT   25 mg, Oral, 3 Times Daily PRN      melatonin 5 MG tablet tablet   5 mg, Oral, Nightly PRN             Changes to Medications        Instructions Start Date   divalproex 500 MG DR tablet  Commonly known as: DEPAKOTE  What changed:   how much to take  when to take this   500 mg, Oral, Daily   Start Date: January 5, 2024            Continue These Medications        Instructions Start Date   acetaminophen 500 MG tablet  Commonly known as: TYLENOL   500 mg, Oral, Every 4 Hours PRN      albuterol sulfate  (90 Base) MCG/ACT inhaler  Commonly known as: PROVENTIL HFA;VENTOLIN HFA;PROAIR HFA   2 puffs, Inhalation, Every 4 Hours PRN      ARIPiprazole 2 MG tablet  Commonly known as: ABILIFY   2 mg, Oral, Every Morning      atorvastatin 20 MG tablet  Commonly known as: LIPITOR   20 mg, Oral, Every Morning      azithromycin 250 MG tablet  Commonly known as: Zithromax Z-Wyatt   Take 2 tablets by mouth on day 1, then 1 tablet daily on days 2-5      clindamycin 1 % swab  Commonly known as: CLEOCIN T   1 application , Topical, 2 Times Daily      escitalopram 10 MG tablet  Commonly known as: LEXAPRO   10 mg, Oral, Daily      ezetimibe 10 MG tablet  Commonly known as: ZETIA   10 mg, Oral, Every Morning      Fluticasone Furoate-Vilanterol 200-25 MCG/INH inhaler  Commonly known as: BREO ELLIPTA   1 puff, Inhalation, Nightly       HYDROcodone-acetaminophen 5-325 MG per tablet  Commonly known as: Norco   1 tablet, Oral, Every 6 Hours PRN      metFORMIN 500 MG tablet  Commonly known as: GLUCOPHAGE   500 mg, Oral, Daily With Breakfast      methocarbamol 500 MG tablet  Commonly known as: ROBAXIN   500 mg, Oral, 4 Times Daily PRN      methylphenidate 27 MG CR tablet   27 mg, Oral, 4 am        methylphenidate 20 MG tablet  Commonly known as: RITALIN   20 mg, Oral, 2 pm       Mirabegron ER 50 MG tablet sustained-release 24 hour 24 hr tablet  Commonly known as: MYRBETRIQ   50 mg, Oral, Every Morning      montelukast 10 MG tablet  Commonly known as: SINGULAIR   10 mg, Oral, Nightly      pantoprazole 40 MG EC tablet  Commonly known as: PROTONIX   40 mg, Oral, Every Morning      sertraline 100 MG tablet  Commonly known as: ZOLOFT   100 mg, Oral, Every Morning      sulfamethoxazole-trimethoprim 800-160 MG per tablet  Commonly known as: BACTRIM DS,SEPTRA DS   1 tablet, Oral, 2 Times Daily             Stop These Medications      doxycycline 100 MG DR capsule  Commonly known as: DORYX     lisinopril-hydrochlorothiazide 20-12.5 MG per tablet  Commonly known as: PRINZIDE,ZESTORETIC     meloxicam 7.5 MG tablet  Commonly known as: MOBIC     rosuvastatin 20 MG tablet  Commonly known as: CRESTOR              Allergies   Allergen Reactions    Amoxicillin-Pot Clavulanate Other (See Comments)     Bad yeast infection         Discharge Disposition:   Home-Health Care [facility]    Diet:  Hospital:  Diet Order   Procedures    Diet: Cardiac Diets; Healthy Heart (2-3 Na+); Texture: Regular Texture (IDDSI 7); Fluid Consistency: Thin (IDDSI 0)         Discharge Activity:   as tolerated       CODE STATUS:  Code Status and Medical Interventions:   Ordered at: 01/01/24 2248     Code Status (Patient has no pulse and is not breathing):    CPR (Attempt to Resuscitate)     Medical Interventions (Patient has pulse or is breathing):    Full Support         No future  appointments.    Additional Instructions for the Follow-ups that You Need to Schedule       Discharge Follow-up with PCP   As directed       Currently Documented PCP:    Vito Schreiber MD    PCP Phone Number:    [phone]     Follow Up Details: 1 week        Discharge Follow-up with Specified Provider: neurology; 1 Week   As directed      To: neurology   Follow Up: 1 Week                Time spent on Discharge including face to face service:  >30 minutes    Signature: Electronically signed by Lucy Sheets MD, 01/04/24, 11:56 EST.  [facility] Hospitalist Team

## 2024-01-04 NOTE — PLAN OF CARE
Problem: Adult Inpatient Plan of Care  Goal: Plan of Care Review  Outcome: Ongoing, Progressing  Goal: Patient-Specific Goal (Individualized)  Outcome: Ongoing, Progressing  Goal: Absence of Hospital-Acquired Illness or Injury  Outcome: Ongoing, Progressing  Intervention: Identify and Manage Fall Risk  Recent Flowsheet Documentation  Taken 1/3/2024 1414 by Radha Diaz RN  Safety Promotion/Fall Prevention: safety round/check completed  Goal: Optimal Comfort and Wellbeing  Outcome: Ongoing, Progressing  Goal: Readiness for Transition of Care  Outcome: Ongoing, Progressing     Problem: Asthma Comorbidity  Goal: Maintenance of Asthma Control  Outcome: Ongoing, Progressing     Problem: Hypertension Comorbidity  Goal: Blood Pressure in Desired Range  Outcome: Ongoing, Progressing   Goal Outcome Evaluation:   Pt's sister was at bedside most of the day. Pt has been independently getting up to use the bathroom as well. No concerns and call light in place.

## 2024-01-08 NOTE — CASE MANAGEMENT/SOCIAL WORK
Case Management Discharge Note      Final Note: Home with OP PT [facility].       Therapy Coordination complete.      Service Provider Selected Services Address Phone Fax Patient Preferred    [facility] - [facility] [address] [place] [address] [place] [address] [phone]                  Transportation Services  Private: Car    Final Discharge Disposition Code: 01 - home or self-care